# Patient Record
Sex: FEMALE | Race: WHITE | NOT HISPANIC OR LATINO | Employment: OTHER | URBAN - METROPOLITAN AREA
[De-identification: names, ages, dates, MRNs, and addresses within clinical notes are randomized per-mention and may not be internally consistent; named-entity substitution may affect disease eponyms.]

---

## 2023-03-01 LAB — SARS-COV-2 RESULT: NOT DETECTED

## 2023-07-11 LAB — C. DIFFICILE TOXIN, PCR: NOT DETECTED

## 2023-10-12 ENCOUNTER — LAB (OUTPATIENT)
Dept: LAB | Facility: LAB | Age: 78
End: 2023-10-12
Payer: COMMERCIAL

## 2023-10-12 DIAGNOSIS — K59.1 FUNCTIONAL DIARRHEA: Primary | ICD-10-CM

## 2023-11-07 ENCOUNTER — LAB (OUTPATIENT)
Dept: LAB | Facility: LAB | Age: 78
End: 2023-11-07
Payer: COMMERCIAL

## 2023-11-07 DIAGNOSIS — Z79.4 LONG TERM (CURRENT) USE OF INSULIN (MULTI): ICD-10-CM

## 2023-11-07 DIAGNOSIS — E11.22 TYPE 2 DIABETES MELLITUS WITH DIABETIC CHRONIC KIDNEY DISEASE (MULTI): ICD-10-CM

## 2023-11-07 DIAGNOSIS — N18.2 CHRONIC KIDNEY DISEASE, STAGE 2 (MILD): ICD-10-CM

## 2023-11-07 DIAGNOSIS — E03.9 HYPOTHYROIDISM, UNSPECIFIED: Primary | ICD-10-CM

## 2023-11-07 LAB
EST. AVERAGE GLUCOSE BLD GHB EST-MCNC: 126 MG/DL
HBA1C MFR BLD: 6 %
T4 FREE SERPL-MCNC: 1.24 NG/DL (ref 0.78–1.48)
TSH SERPL-ACNC: 5.26 MIU/L (ref 0.44–3.98)

## 2023-11-07 PROCEDURE — 83036 HEMOGLOBIN GLYCOSYLATED A1C: CPT

## 2023-11-07 PROCEDURE — 36415 COLL VENOUS BLD VENIPUNCTURE: CPT

## 2023-11-07 PROCEDURE — 84443 ASSAY THYROID STIM HORMONE: CPT

## 2023-11-07 PROCEDURE — 84439 ASSAY OF FREE THYROXINE: CPT

## 2023-11-30 ENCOUNTER — OFFICE VISIT (OUTPATIENT)
Dept: VASCULAR SURGERY | Facility: CLINIC | Age: 78
End: 2023-11-30
Payer: COMMERCIAL

## 2023-11-30 VITALS
HEIGHT: 63 IN | WEIGHT: 160 LBS | BODY MASS INDEX: 28.35 KG/M2 | HEART RATE: 58 BPM | SYSTOLIC BLOOD PRESSURE: 120 MMHG | DIASTOLIC BLOOD PRESSURE: 60 MMHG

## 2023-11-30 DIAGNOSIS — T82.858D ARTERIOVENOUS FISTULA STENOSIS, SUBSEQUENT ENCOUNTER: Primary | ICD-10-CM

## 2023-11-30 PROCEDURE — 1160F RVW MEDS BY RX/DR IN RCRD: CPT | Performed by: SURGERY

## 2023-11-30 PROCEDURE — 99214 OFFICE O/P EST MOD 30 MIN: CPT | Performed by: SURGERY

## 2023-11-30 PROCEDURE — 1125F AMNT PAIN NOTED PAIN PRSNT: CPT | Performed by: SURGERY

## 2023-11-30 PROCEDURE — 1159F MED LIST DOCD IN RCRD: CPT | Performed by: SURGERY

## 2023-11-30 RX ORDER — LISINOPRIL 2.5 MG/1
2.5 TABLET ORAL
COMMUNITY
End: 2024-02-06 | Stop reason: ALTCHOICE

## 2023-11-30 RX ORDER — HUMAN INSULIN 100 [IU]/ML
INJECTION, SUSPENSION SUBCUTANEOUS
COMMUNITY

## 2023-11-30 RX ORDER — LOPERAMIDE HYDROCHLORIDE 2 MG/1
CAPSULE ORAL
COMMUNITY
Start: 2020-06-24

## 2023-11-30 RX ORDER — METOPROLOL TARTRATE 25 MG/1
12.5 TABLET, FILM COATED ORAL DAILY
COMMUNITY

## 2023-11-30 RX ORDER — CALCIUM CARBONATE 400(1000)
TABLET,CHEWABLE ORAL
COMMUNITY
Start: 2019-06-28

## 2023-11-30 RX ORDER — LANOLIN ALCOHOL/MO/W.PET/CERES
1 CREAM (GRAM) TOPICAL DAILY
COMMUNITY
Start: 2017-11-02

## 2023-11-30 RX ORDER — ATORVASTATIN CALCIUM 40 MG/1
40 TABLET, FILM COATED ORAL NIGHTLY
COMMUNITY

## 2023-11-30 RX ORDER — LEVOTHYROXINE SODIUM 50 UG/1
TABLET ORAL
COMMUNITY
End: 2024-02-06 | Stop reason: ALTCHOICE

## 2023-11-30 RX ORDER — GABAPENTIN 100 MG/1
CAPSULE ORAL
COMMUNITY

## 2023-11-30 RX ORDER — ERGOCALCIFEROL 1.25 MG/1
1 CAPSULE ORAL
COMMUNITY

## 2023-11-30 RX ORDER — RENO CAPS 100; 1.5; 1.7; 20; 10; 1; 150; 5; 6 MG/1; MG/1; MG/1; MG/1; MG/1; MG/1; UG/1; MG/1; UG/1
CAPSULE ORAL
COMMUNITY
End: 2024-01-31 | Stop reason: ALTCHOICE

## 2023-11-30 RX ORDER — PEN NEEDLE, DIABETIC 29 G X1/2"
NEEDLE, DISPOSABLE MISCELLANEOUS
COMMUNITY
Start: 2023-08-07

## 2023-11-30 RX ORDER — TRAMADOL HYDROCHLORIDE 50 MG/1
50 TABLET ORAL EVERY 6 HOURS PRN
COMMUNITY
Start: 2023-10-17

## 2023-11-30 RX ORDER — ASPIRIN 81 MG/1
1 TABLET ORAL DAILY
COMMUNITY
Start: 2019-08-07

## 2023-12-01 PROBLEM — T82.858A ARTERIOVENOUS FISTULA STENOSIS (CMS-HCC): Status: ACTIVE | Noted: 2023-12-01

## 2023-12-12 PROBLEM — I95.3 HEMODIALYSIS-ASSOCIATED HYPOTENSION: Status: ACTIVE | Noted: 2023-12-12

## 2023-12-12 PROBLEM — S82.101A: Status: ACTIVE | Noted: 2023-12-12

## 2023-12-12 PROBLEM — G95.20 SPINAL CORD COMPRESSION (MULTI): Status: ACTIVE | Noted: 2023-12-12

## 2023-12-12 PROBLEM — D61.818 PANCYTOPENIA (MULTI): Status: ACTIVE | Noted: 2023-12-12

## 2023-12-12 PROBLEM — I35.0 AORTIC STENOSIS: Status: ACTIVE | Noted: 2023-12-12

## 2023-12-12 PROBLEM — K58.0 IRRITABLE BOWEL SYNDROME WITH DIARRHEA: Status: ACTIVE | Noted: 2023-06-14

## 2023-12-12 PROBLEM — M47.816 LUMBAR SPONDYLOSIS: Status: ACTIVE | Noted: 2023-12-12

## 2023-12-12 PROBLEM — I50.30 DIASTOLIC CONGESTIVE HEART FAILURE (MULTI): Status: ACTIVE | Noted: 2023-12-12

## 2023-12-12 PROBLEM — R60.9 EDEMA: Status: ACTIVE | Noted: 2023-12-12

## 2023-12-12 PROBLEM — R19.7 DIARRHEA OF PRESUMED INFECTIOUS ORIGIN: Status: ACTIVE | Noted: 2023-06-14

## 2023-12-12 PROBLEM — G89.29 CHRONIC NECK PAIN: Status: ACTIVE | Noted: 2023-12-12

## 2023-12-12 PROBLEM — G95.9 CERVICAL MYELOPATHY (MULTI): Status: ACTIVE | Noted: 2023-12-12

## 2023-12-12 PROBLEM — E03.9 ACQUIRED HYPOTHYROIDISM: Status: ACTIVE | Noted: 2023-06-14

## 2023-12-12 PROBLEM — M19.90 ARTHRITIS: Status: ACTIVE | Noted: 2023-12-12

## 2023-12-12 PROBLEM — E83.39 HYPERPHOSPHATEMIA: Status: ACTIVE | Noted: 2023-12-12

## 2023-12-12 PROBLEM — M79.603 ARM PAIN: Status: ACTIVE | Noted: 2023-12-12

## 2023-12-12 PROBLEM — G62.9 NEUROPATHY: Status: ACTIVE | Noted: 2023-12-12

## 2023-12-12 PROBLEM — G56.11 RIGHT MEDIAN NERVE NEUROPATHY: Status: ACTIVE | Noted: 2023-12-12

## 2023-12-12 PROBLEM — I82.B19: Status: ACTIVE | Noted: 2023-12-12

## 2023-12-12 PROBLEM — G99.2 STENOSIS OF CERVICAL SPINE WITH MYELOPATHY (MULTI): Status: ACTIVE | Noted: 2023-12-12

## 2023-12-12 PROBLEM — N18.6 END STAGE RENAL FAILURE ON DIALYSIS (MULTI): Status: ACTIVE | Noted: 2023-07-19

## 2023-12-12 PROBLEM — E11.22 TYPE 2 DIABETES MELLITUS WITH DIABETIC CHRONIC KIDNEY DISEASE (MULTI): Status: ACTIVE | Noted: 2023-06-14

## 2023-12-12 PROBLEM — M54.2 CHRONIC NECK PAIN: Status: ACTIVE | Noted: 2023-12-12

## 2023-12-12 PROBLEM — M54.16 LUMBAR NEURITIS: Status: ACTIVE | Noted: 2023-12-12

## 2023-12-12 PROBLEM — Z99.2: Status: ACTIVE | Noted: 2023-12-12

## 2023-12-12 PROBLEM — M48.061 LUMBAR FORAMINAL STENOSIS: Status: ACTIVE | Noted: 2023-12-12

## 2023-12-12 PROBLEM — M48.02 STENOSIS OF CERVICAL SPINE WITH MYELOPATHY (MULTI): Status: ACTIVE | Noted: 2023-12-12

## 2023-12-12 PROBLEM — I50.30 (HFPEF) HEART FAILURE WITH PRESERVED EJECTION FRACTION (MULTI): Status: ACTIVE | Noted: 2023-12-12

## 2023-12-12 PROBLEM — M54.12 CERVICAL RADICULITIS: Status: ACTIVE | Noted: 2023-12-12

## 2023-12-12 PROBLEM — E78.1 ENDOGENOUS HYPERLIPIDEMIA: Status: ACTIVE | Noted: 2023-12-12

## 2023-12-12 PROBLEM — R79.89 LOW VITAMIN D LEVEL: Status: ACTIVE | Noted: 2023-12-12

## 2023-12-12 PROBLEM — S83.411A SPRAIN OF MEDIAL COLLATERAL LIGAMENT OF RIGHT KNEE: Status: ACTIVE | Noted: 2023-12-12

## 2023-12-12 PROBLEM — E78.00 HYPERCHOLESTEROLEMIA: Status: ACTIVE | Noted: 2023-12-12

## 2023-12-12 PROBLEM — I10 PRIMARY HYPERTENSION: Status: ACTIVE | Noted: 2023-06-14

## 2023-12-12 PROBLEM — K59.1 FUNCTIONAL DIARRHEA: Status: ACTIVE | Noted: 2023-07-19

## 2023-12-12 PROBLEM — I87.309 VENOUS HYPERTENSION: Status: ACTIVE | Noted: 2023-12-12

## 2023-12-12 PROBLEM — M25.561 RIGHT KNEE PAIN: Status: ACTIVE | Noted: 2023-12-12

## 2023-12-12 PROBLEM — Z99.2 END STAGE RENAL FAILURE ON DIALYSIS (MULTI): Status: ACTIVE | Noted: 2023-07-19

## 2023-12-12 PROBLEM — R79.89 ELEVATED TROPONIN: Status: ACTIVE | Noted: 2023-12-12

## 2023-12-12 PROBLEM — I10 HYPERTENSION, BENIGN: Status: ACTIVE | Noted: 2023-12-12

## 2023-12-12 PROBLEM — Z86.2 HISTORY OF THROMBOCYTOPENIA: Status: ACTIVE | Noted: 2023-12-12

## 2023-12-12 PROBLEM — E11.9 DIABETES MELLITUS (MULTI): Status: ACTIVE | Noted: 2023-12-12

## 2023-12-12 PROBLEM — I22.2 SUBSEQUENT NON-ST ELEVATION (NSTEMI) MYOCARDIAL INFARCTION (MULTI): Status: ACTIVE | Noted: 2023-12-12

## 2023-12-12 RX ORDER — RENO CAPS 100; 1.5; 1.7; 20; 10; 1; 150; 5; 6 MG/1; MG/1; MG/1; MG/1; MG/1; MG/1; UG/1; MG/1; UG/1
CAPSULE ORAL EVERY 24 HOURS
COMMUNITY

## 2023-12-12 RX ORDER — PREGABALIN 75 MG/1
CAPSULE ORAL
COMMUNITY
Start: 2010-11-05 | End: 2024-01-31 | Stop reason: ALTCHOICE

## 2023-12-12 RX ORDER — PANTOPRAZOLE SODIUM 40 MG/1
TABLET, DELAYED RELEASE ORAL
COMMUNITY
Start: 2023-06-06 | End: 2024-01-31 | Stop reason: ALTCHOICE

## 2023-12-12 RX ORDER — TRIAMTERENE AND HYDROCHLOROTHIAZIDE 37.5; 25 MG/1; MG/1
CAPSULE ORAL
COMMUNITY
Start: 2009-06-29 | End: 2024-01-31 | Stop reason: ALTCHOICE

## 2023-12-12 RX ORDER — MELOXICAM 15 MG/1
TABLET ORAL
COMMUNITY
Start: 2009-07-13 | End: 2024-01-31 | Stop reason: ALTCHOICE

## 2023-12-12 RX ORDER — INSULIN LISPRO 100 [IU]/ML
INJECTION, SOLUTION INTRAVENOUS; SUBCUTANEOUS
COMMUNITY
Start: 2023-04-17 | End: 2024-01-31 | Stop reason: ALTCHOICE

## 2023-12-12 RX ORDER — CHOLECALCIFEROL (VITAMIN D3) 50 MCG
TABLET ORAL
COMMUNITY
Start: 2010-11-05

## 2023-12-12 RX ORDER — LISINOPRIL 40 MG/1
1 TABLET ORAL DAILY
COMMUNITY
Start: 2017-11-02

## 2023-12-12 RX ORDER — CYCLOBENZAPRINE HCL 10 MG
TABLET ORAL
COMMUNITY
Start: 2010-11-05 | End: 2024-01-31 | Stop reason: ALTCHOICE

## 2023-12-12 RX ORDER — HYDROCODONE BITARTRATE AND ACETAMINOPHEN 5; 325 MG/1; MG/1
1 TABLET ORAL
COMMUNITY
End: 2024-01-31 | Stop reason: ALTCHOICE

## 2023-12-12 RX ORDER — INSULIN ASPART 100 [IU]/ML
INJECTION, SOLUTION INTRAVENOUS; SUBCUTANEOUS
COMMUNITY
End: 2024-02-06 | Stop reason: ALTCHOICE

## 2023-12-12 RX ORDER — OXYCODONE AND ACETAMINOPHEN 5; 325 MG/1; MG/1
TABLET ORAL
COMMUNITY
Start: 2009-06-29 | End: 2024-01-31 | Stop reason: ALTCHOICE

## 2023-12-12 RX ORDER — LISINOPRIL 20 MG/1
20 TABLET ORAL DAILY
COMMUNITY
End: 2024-02-06 | Stop reason: ALTCHOICE

## 2023-12-12 RX ORDER — GLYBURIDE 5 MG/1
TABLET ORAL
COMMUNITY
Start: 2009-06-29 | End: 2024-01-31 | Stop reason: ALTCHOICE

## 2023-12-12 RX ORDER — LISINOPRIL 10 MG/1
TABLET ORAL
COMMUNITY
Start: 2009-06-29 | End: 2024-01-31 | Stop reason: ALTCHOICE

## 2023-12-12 RX ORDER — LEVOTHYROXINE SODIUM 100 UG/1
TABLET ORAL
COMMUNITY
Start: 2023-04-23

## 2023-12-13 ENCOUNTER — APPOINTMENT (OUTPATIENT)
Dept: PAIN MEDICINE | Facility: CLINIC | Age: 78
End: 2023-12-13
Payer: COMMERCIAL

## 2023-12-18 ENCOUNTER — TELEPHONE (OUTPATIENT)
Dept: GASTROENTEROLOGY | Facility: CLINIC | Age: 78
End: 2023-12-18
Payer: COMMERCIAL

## 2023-12-28 ENCOUNTER — TELEPHONE (OUTPATIENT)
Dept: GASTROENTEROLOGY | Facility: CLINIC | Age: 78
End: 2023-12-28
Payer: COMMERCIAL

## 2023-12-28 NOTE — TELEPHONE ENCOUNTER
Left voicemail for patient to schedule consult appt with Dr. Suarez per referral received via fax.

## 2024-01-31 ENCOUNTER — OFFICE VISIT (OUTPATIENT)
Dept: PAIN MEDICINE | Facility: CLINIC | Age: 79
End: 2024-01-31
Payer: COMMERCIAL

## 2024-01-31 VITALS
TEMPERATURE: 97.3 F | WEIGHT: 160 LBS | DIASTOLIC BLOOD PRESSURE: 67 MMHG | BODY MASS INDEX: 27.31 KG/M2 | SYSTOLIC BLOOD PRESSURE: 145 MMHG | RESPIRATION RATE: 15 BRPM | HEIGHT: 64 IN | HEART RATE: 60 BPM

## 2024-01-31 DIAGNOSIS — M54.12 CERVICAL RADICULITIS: Primary | ICD-10-CM

## 2024-01-31 DIAGNOSIS — M54.2 CHRONIC NECK PAIN: ICD-10-CM

## 2024-01-31 DIAGNOSIS — M54.16 LUMBAR NEURITIS: ICD-10-CM

## 2024-01-31 DIAGNOSIS — M48.061 LUMBAR FORAMINAL STENOSIS: ICD-10-CM

## 2024-01-31 DIAGNOSIS — G89.29 CHRONIC NECK PAIN: ICD-10-CM

## 2024-01-31 PROCEDURE — 99214 OFFICE O/P EST MOD 30 MIN: CPT | Performed by: ANESTHESIOLOGY

## 2024-01-31 SDOH — ECONOMIC STABILITY: FOOD INSECURITY: WITHIN THE PAST 12 MONTHS, YOU WORRIED THAT YOUR FOOD WOULD RUN OUT BEFORE YOU GOT MONEY TO BUY MORE.: NEVER TRUE

## 2024-01-31 SDOH — ECONOMIC STABILITY: FOOD INSECURITY: WITHIN THE PAST 12 MONTHS, THE FOOD YOU BOUGHT JUST DIDN'T LAST AND YOU DIDN'T HAVE MONEY TO GET MORE.: NEVER TRUE

## 2024-01-31 ASSESSMENT — LIFESTYLE VARIABLES
HOW OFTEN DO YOU HAVE A DRINK CONTAINING ALCOHOL: NEVER
HOW OFTEN DO YOU HAVE SIX OR MORE DRINKS ON ONE OCCASION: NEVER
SKIP TO QUESTIONS 9-10: 1
AUDIT-C TOTAL SCORE: 0
HOW MANY STANDARD DRINKS CONTAINING ALCOHOL DO YOU HAVE ON A TYPICAL DAY: PATIENT DOES NOT DRINK

## 2024-01-31 ASSESSMENT — PATIENT HEALTH QUESTIONNAIRE - PHQ9
SUM OF ALL RESPONSES TO PHQ9 QUESTIONS 1 AND 2: 0
2. FEELING DOWN, DEPRESSED OR HOPELESS: NOT AT ALL
1. LITTLE INTEREST OR PLEASURE IN DOING THINGS: NOT AT ALL

## 2024-01-31 ASSESSMENT — COLUMBIA-SUICIDE SEVERITY RATING SCALE - C-SSRS
6. HAVE YOU EVER DONE ANYTHING, STARTED TO DO ANYTHING, OR PREPARED TO DO ANYTHING TO END YOUR LIFE?: NO
2. HAVE YOU ACTUALLY HAD ANY THOUGHTS OF KILLING YOURSELF?: NO
1. IN THE PAST MONTH, HAVE YOU WISHED YOU WERE DEAD OR WISHED YOU COULD GO TO SLEEP AND NOT WAKE UP?: NO

## 2024-01-31 ASSESSMENT — ENCOUNTER SYMPTOMS: OCCASIONAL FEELINGS OF UNSTEADINESS: 1

## 2024-01-31 ASSESSMENT — PAIN SCALES - GENERAL: PAINLEVEL: 7

## 2024-01-31 NOTE — PROGRESS NOTES
History Of Present Illness  Lyly Draper is a 79 y.o. female presenting with   Chief Complaint   Patient presents with    Follow-up     Patient who is RHD presents with complaints of chronic right arm pain with N/T in her bilateral hands. Pt reports pain in her bilateral LE including her hips. On HD MWF at [facility] on [address].      The pt is s/p L4 thru S1 posterior spinal fusion with rods and screws in 2010 with Dr. Dove at [facility].      The pt underwent C3-7 cervical fusion surgery with Dr. Gamino on 3-3-2023.      The pt also has a history of low back pain to the B/L anterolateral LE. The pain is constant, worse with activity and better with rest. The pain is sharp, stabbing and shooting to the B/L LE. Denies LE paresthesias, weakness, saddle anesthesia, bowel or bladder incontinence. The patient has ESRD and HD thru a left arm fistula MWF at [facility], IDDM, DLD, Hypothyroidism.      PSHx  -Left arm Fistula with Dr. Alford   -Lumbar surgery with Dr. Dove in Nov of 2010 with rods and screw placement.   -Tonsillectomy     PAIN SCORE: 7/10.     PCP: Dr. James Medina          Past Medical History  She has a past medical history of Personal history of diseases of the blood and blood-forming organs and certain disorders involving the immune mechanism, Personal history of other diseases of the circulatory system (08/16/2022), Personal history of other diseases of urinary system (08/16/2022), Personal history of other drug therapy, Personal history of other endocrine, nutritional and metabolic disease (08/16/2022), and Personal history of other endocrine, nutritional and metabolic disease (08/16/2022).    Surgical History  She has a past surgical history that includes Back surgery (11/02/2017); Other surgical history (08/16/2022); and Other surgical history (11/27/2017).     Social History  She reports that she has never smoked. She has never used smokeless tobacco. She reports that she does not drink alcohol  and does not use drugs.    Family History  No family history on file.     Allergies  Penicillins    Review of Systems    All other systems reviewed and negative for any deficits. Pertinent positives and negatives were considered in the medical decision making process.        Physical Exam  /67   Pulse 60   Temp 36.3 °C (97.3 °F)   Resp 15   Wt 72.6 kg (160 lb)     General: Pt appears stated age    Eyes: Conjunctiva non-icteric and lids without obvious rash or drooping. Pupils are symmetric    ENT: External ears and nose appear to be without deformity or rash. No lesions or masses noted. Hearing is grossly intact    Neck: No JVD noted, tracheal position midline. No goiter noted on assessment of thyroid    Respiratory: No gasping or shortness of breath noted, no use of accessory muscles noted    Cardiovascular: Extremities show no edema or varicosities    Skin: No rashes or open lesions/ulcers identified on skin. No induration/tightening noted with palpation of skin    Musculoskeletal: Gait is antalgic, uses a wheeled walker     Digits/nails show no clubbing or cyanosis    Exam of muscles/joints/bones shows no gross atrophy and no abnormal/involuntary movements in the head/neckNo asymmetry or masses noted in the head/neck    Stability: no subluxation noted on movement of bilateral upper extremities or head/neck    Strength: 5/5 in RLE and 5/5 LLE     Range of Motion: WNL     Neurologic: Reflexes: 2+     Sensation: WNL    Cranial nerves 2-12 are grossly intact    Psychiatric: Pt is alert and oriented to time, place and person.         Assessment/Plan   1. Cervical radiculitis  Epidural Steroid Injection    FL pain management TC      2. Chronic neck pain        3. Lumbar neuritis        4. Lumbar foraminal stenosis              Diagnoses/Problems   · Cervical radiculitis (723.4) (M54.12)   · Chronic neck pain (723.1,338.29) (M54.2,G89.29)   · Cervical postlaminectomy syndrome (722.81) (M96.1)   · Cervical  myelopathy (721.1) (G95.9)     Provider Impressions     1. I have previously provided the patient with a list of physical therapy exercises to learn and perform to strengthen core.      2. I would recommend the pt CONTINUE on Gabapentin to help with nerve related pain. We discussed the risks, benefits, and side effects to this medication including the mechanism of action and the pt understands and agrees.     3. I extensively reviewed the patients LUMBAR X-RAYS and hip x-ray findings in detail, including review of the actual images and provided a detailed explanation of the findings using a spine model.     There is multilevel spondylolisthesis of the lumbar spine and previous spinal fusion of L4 thru S1.   There is mild OA of the bilateral hips without any acute process.      4. We did discuss the risks, benefits, and alternatives to chronic opioid therapy and that usage can be a danger to life. We also discussed proper and safe storage of medication to prevent unauthorized usage. The patient understands and will use the medicine responsibly.     Medication is managed by PCP.      Pt may switch to our office. I did discuss the need for frequent follow ups, UDS, and contract.      5. The patient is a candidate for an IRVIN at T1/2 to treat back and radicular pain. I spent time with the patient discussing all of the risks, benefits, and alternatives to this measure. Including but not limited to spinal infection, epidural hematoma/abscess, paralysis, nerve injury, steroid effects, and spinal headache.     We will contact the patients PCP to determine if it is safe to stop ASA for 7 days prior to procedure. If Pt is to be bridged on Lovenox they will need to be off of Lovenox for 24 hours prior to the procedure. We did discuss the risks for stopping anticoagulation including, but not limited to any cardiovascular event, embolism, and even death. The patient understands these risks and would like to proceed with the  procedure.    6. I again extensively reviewed the patients Cervical x-ray and MRI findings in detail, including review of the actual images and provided a detailed explanation of the findings using a spine model.      There multilevel cervical spondylolisthesis with disc herniations at C5/6, C6/7 and C7/T1. There is noted severe stenosis at the C3/4 level due to large disc osteophyte.      The pt underwent C3-7 cervical fusion surgery with Dr. Gamino on 3-3-2023 with rods and screws.      The pt stated her postoperative course was difficult for her. She had trouble talking/swallowing and was in the hospital for 1 week and then in Rehab for 2.5 months time.      FUV PRN     I spent time with the patient reviewing their imaging and discussing the risks benefits and alternatives to the above plan. A total of 30 minutes was spent reviewing the data and greater than 50% of that time was with the patient during the face to face encounter discussing treatment options both surgical, non-surgical, and minimally invasive techniques.     Aram Tran MD

## 2024-02-06 ENCOUNTER — OFFICE VISIT (OUTPATIENT)
Dept: GASTROENTEROLOGY | Facility: CLINIC | Age: 79
End: 2024-02-06
Payer: COMMERCIAL

## 2024-02-06 VITALS
SYSTOLIC BLOOD PRESSURE: 135 MMHG | HEART RATE: 56 BPM | BODY MASS INDEX: 28.35 KG/M2 | DIASTOLIC BLOOD PRESSURE: 61 MMHG | WEIGHT: 160 LBS | HEIGHT: 63 IN

## 2024-02-06 DIAGNOSIS — K58.0 IRRITABLE BOWEL SYNDROME WITH DIARRHEA: Primary | ICD-10-CM

## 2024-02-06 PROCEDURE — 1125F AMNT PAIN NOTED PAIN PRSNT: CPT | Performed by: INTERNAL MEDICINE

## 2024-02-06 PROCEDURE — 1160F RVW MEDS BY RX/DR IN RCRD: CPT | Performed by: INTERNAL MEDICINE

## 2024-02-06 PROCEDURE — 1159F MED LIST DOCD IN RCRD: CPT | Performed by: INTERNAL MEDICINE

## 2024-02-06 PROCEDURE — 3075F SYST BP GE 130 - 139MM HG: CPT | Performed by: INTERNAL MEDICINE

## 2024-02-06 PROCEDURE — 3078F DIAST BP <80 MM HG: CPT | Performed by: INTERNAL MEDICINE

## 2024-02-06 PROCEDURE — 99204 OFFICE O/P NEW MOD 45 MIN: CPT | Performed by: INTERNAL MEDICINE

## 2024-02-06 PROCEDURE — 1036F TOBACCO NON-USER: CPT | Performed by: INTERNAL MEDICINE

## 2024-02-06 ASSESSMENT — ENCOUNTER SYMPTOMS
NEUROLOGICAL NEGATIVE: 1
EYES NEGATIVE: 1
CONSTITUTIONAL NEGATIVE: 1
ROS GI COMMENTS: AS IN HPI
PSYCHIATRIC NEGATIVE: 1
RESPIRATORY NEGATIVE: 1
ENDOCRINE NEGATIVE: 1
MUSCULOSKELETAL NEGATIVE: 1
CARDIOVASCULAR NEGATIVE: 1

## 2024-02-06 NOTE — PROGRESS NOTES
Subjective     History of Present Illness:   Lyly Draper is a 79 y.o. female who presents to GI clinic for chronic diarrhea.  What she calls diarrhea is actually loose stools which she has not every day only sometimes when she has bowel movements.  She only has 1 bowel movement a day sometimes 2.  She said she has been suffering with this for 15 years and then it got better on its own and then most recently we got worse afterwards.  She does have a lot of medical problems is on end-stage renal disease on dialysis and recently had surgery on her cervical spine.  .  He recently started taking a probiotic which helped with her symptoms.  She said it did not completely resolve her symptoms but help with her symptoms.        Review of Systems  Review of Systems   Constitutional: Negative.    HENT: Negative.     Eyes: Negative.    Respiratory: Negative.     Cardiovascular: Negative.    Gastrointestinal:         As in HPI    Endocrine: Negative.    Genitourinary: Negative.    Musculoskeletal: Negative.    Skin: Negative.    Neurological: Negative.    Psychiatric/Behavioral: Negative.         Past Medical History   has a past medical history of Personal history of diseases of the blood and blood-forming organs and certain disorders involving the immune mechanism, Personal history of other diseases of the circulatory system (08/16/2022), Personal history of other diseases of urinary system (08/16/2022), Personal history of other drug therapy, Personal history of other endocrine, nutritional and metabolic disease (08/16/2022), and Personal history of other endocrine, nutritional and metabolic disease (08/16/2022).     Social History   reports that she has never smoked. She has never used smokeless tobacco. She reports that she does not drink alcohol and does not use drugs.     Family History  family history is not on file.     Allergies  Allergies   Allergen Reactions    Penicillins Anaphylaxis and Rash  "      Medications  Current Outpatient Medications   Medication Instructions    aspirin 81 mg EC tablet 1 tablet, oral, Daily    atorvastatin (LIPITOR) 40 mg, oral, Nightly    B complex-vitamin C-folic acid (Lewis Caps) 1 mg capsule Every 24 hours    BD Insulin Syringe Ultra-Fine 0.5 mL 31 gauge x 5/16\" syringe USE AS DIRECTED TWICE A DAY    calcium carbonate (Tums Ultra) 400 mg calcium (1,000 mg) chewable tablet oral    cholecalciferol (Vitamin D3) 50 MCG (2000 UT) tablet BID    cyanocobalamin (Vitamin B-12) 1,000 mcg tablet 1 tablet, oral, Daily    ergocalciferol (Vitamin D-2) 1.25 MG (82681 UT) capsule 1 capsule, oral, Weekly    gabapentin (Neurontin) 100 mg capsule TAKE 2 CAPSULE IN THE MORNING (AFTER DIALYSIS) AND TAKE 2 CAPSULES AT BEDTIME DAILY.    insulin NPH and regular human (HumuLIN 70-30, NovoLIN 70-30) 100 unit/mL (70-30) injection subcutaneous, 2 times daily before meals, Take as directed per insulin instructions.    levothyroxine (Synthroid, Levoxyl) 100 mcg tablet     lisinopril 40 mg tablet 1 tablet, oral, Daily    loperamide (Imodium) 2 mg capsule oral    metoprolol tartrate (LOPRESSOR) 12.5 mg, oral, Daily    NovoLIN N NPH U-100 Insulin 100 unit/mL injection 15 UNITS SUBCUTANEOUSLY ONCE DAILY 90 DAYS    traMADol (ULTRAM) 50 mg, oral, Every 6 hours PRN        Objective   Visit Vitals  /61   Pulse 56      Physical Exam  Vitals reviewed.   Constitutional:       Appearance: Normal appearance.   HENT:      Head: Normocephalic.   Eyes:      Conjunctiva/sclera: Conjunctivae normal.      Pupils: Pupils are equal, round, and reactive to light.   Cardiovascular:      Rate and Rhythm: Normal rate and regular rhythm.   Pulmonary:      Effort: Pulmonary effort is normal.      Breath sounds: Normal breath sounds.   Abdominal:      General: Abdomen is flat. Bowel sounds are normal. There is no distension.      Palpations: Abdomen is soft.      Tenderness: There is no abdominal tenderness. There is no " guarding or rebound.   Musculoskeletal:         General: Normal range of motion.   Skin:     General: Skin is warm and dry.   Neurological:      General: No focal deficit present.      Mental Status: She is alert and oriented to person, place, and time.           [unfilled]       Assessment/Plan   Lyly Draper is a 79 y.o. female who presents to GI clinic for evaluation of chronic diarrhea.  She really has only 1 time bowel movements a day.  Symptoms are getting better with probiotic.  She had a colonoscopy more than 10 years ago and she does not think she can get another one.    I will do some basic stool test however I do not think that she needs anything significant at this time.  I did told her to use fiber or Metamucil on a daily basis.  Continue probiotic.  Follow-up as needed.  .          Bull Suarez MD

## 2024-02-17 PROCEDURE — 83993 ASSAY FOR CALPROTECTIN FECAL: CPT | Performed by: INTERNAL MEDICINE

## 2024-02-17 PROCEDURE — 82653 EL-1 FECAL QUANTITATIVE: CPT | Performed by: INTERNAL MEDICINE

## 2024-02-18 ENCOUNTER — HOSPITAL ENCOUNTER (OUTPATIENT)
Dept: RADIOLOGY | Facility: CLINIC | Age: 79
Discharge: HOME | End: 2024-02-18
Payer: COMMERCIAL

## 2024-02-18 DIAGNOSIS — M25.562 ACUTE PAIN OF LEFT KNEE: ICD-10-CM

## 2024-02-18 PROCEDURE — 73564 X-RAY EXAM KNEE 4 OR MORE: CPT | Mod: LT

## 2024-02-18 PROCEDURE — 73564 X-RAY EXAM KNEE 4 OR MORE: CPT | Mod: LEFT SIDE | Performed by: RADIOLOGY

## 2024-02-19 ENCOUNTER — LAB (OUTPATIENT)
Dept: LAB | Facility: LAB | Age: 79
End: 2024-02-19
Payer: COMMERCIAL

## 2024-02-22 ENCOUNTER — OFFICE VISIT (OUTPATIENT)
Dept: VASCULAR SURGERY | Facility: CLINIC | Age: 79
End: 2024-02-22
Payer: COMMERCIAL

## 2024-02-22 VITALS
HEIGHT: 63 IN | DIASTOLIC BLOOD PRESSURE: 56 MMHG | HEART RATE: 96 BPM | BODY MASS INDEX: 28.35 KG/M2 | OXYGEN SATURATION: 98 % | WEIGHT: 160 LBS | SYSTOLIC BLOOD PRESSURE: 108 MMHG

## 2024-02-22 DIAGNOSIS — T82.858D ARTERIOVENOUS FISTULA STENOSIS, SUBSEQUENT ENCOUNTER: Primary | ICD-10-CM

## 2024-02-22 LAB
CALPROTECTIN STL-MCNT: 21 UG/G
ELASTASE PANC STL-MCNT: 159 UG/G

## 2024-02-22 PROCEDURE — 1036F TOBACCO NON-USER: CPT | Performed by: SURGERY

## 2024-02-22 PROCEDURE — 99214 OFFICE O/P EST MOD 30 MIN: CPT | Performed by: SURGERY

## 2024-02-22 PROCEDURE — 1160F RVW MEDS BY RX/DR IN RCRD: CPT | Performed by: SURGERY

## 2024-02-22 PROCEDURE — 1159F MED LIST DOCD IN RCRD: CPT | Performed by: SURGERY

## 2024-02-22 PROCEDURE — 3078F DIAST BP <80 MM HG: CPT | Performed by: SURGERY

## 2024-02-22 PROCEDURE — 3074F SYST BP LT 130 MM HG: CPT | Performed by: SURGERY

## 2024-02-22 PROCEDURE — 1125F AMNT PAIN NOTED PAIN PRSNT: CPT | Performed by: SURGERY

## 2024-02-24 NOTE — PROGRESS NOTES
"Lyly Draper is a 79 y.o. female     Subjective   This patient presents today for follow-up of her upper arm AV fistula.  She states that dialysis is going relatively well.  Her left arm does swell but seems to be tolerable to her per the patient.  She has never smoked.  She is a diabetic.  She is currently 79 years old.  She denies any fever chills nausea vomiting or headache         Objective     Vitals:    02/22/24 0900   BP: 108/56   Pulse: 96   SpO2: 98%      Physical Exam  This patient is alert and oriented x 3.  She is in no acute distress.  Head is normocephalic.  Pupils are equal and reactive to light and accommodation.  Neck is soft and supple without palpable lymph nodes.  Heart is regular rate.  Lungs are clear.  Abdomen is soft with positive bowel sounds there is no pain on palpation.  Right upper extremity seems to have adequate range of motion.  Her left upper extremity has some decreased range of motion.  Her left arm volume is much larger than her right.  Her lower extremities seem to have some decreased range of motion with palpable brachial radial and femoral pulses.  Her left upper arm AV fistula has a slight thrill but mostly pulse.  Psychologically she seems to be acting appropriately  Blood pressure 108/56, pulse 96, height 1.6 m (5' 3\"), weight 72.6 kg (160 lb), SpO2 98 %.            Patient Active Problem List    Diagnosis Date Noted    Aortic stenosis 12/12/2023    Arm pain 12/12/2023    Arthritis 12/12/2023    Cervical myelopathy (CMS/[facility]) 12/12/2023    Stenosis of cervical spine with myelopathy (CMS/[facility]) 12/12/2023    Cervical radiculitis 12/12/2023    Chronic neck pain 12/12/2023    Diabetes mellitus (CMS/[facility]) 12/12/2023    (HFpEF) heart failure with preserved ejection fraction (CMS/[facility]) 12/12/2023    Diastolic congestive heart failure (CMS/[facility]) 12/12/2023    Edema 12/12/2023    Elevated troponin 12/12/2023    Hemodialysis access, fistula mature (CMS/[facility]) 12/12/2023    " "Hemodialysis-associated hypotension 12/12/2023    History of thrombocytopenia 12/12/2023    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) 12/12/2023    Right knee pain 12/12/2023    Right median nerve neuropathy 12/12/2023    Spinal cord compression (CMS/[facility]) 12/12/2023    Sprain of medial collateral ligament of right knee 12/12/2023    Subclavian vein obstruction (CMS/[facility]) 12/12/2023    Subsequent non-ST elevation (NSTEMI) myocardial infarction (CMS/[facility]) 12/12/2023    Venous hypertension 12/12/2023    Stenosis of AV fistula (CMS/[facility]) 12/01/2023    End stage renal failure on dialysis (CMS/[facility]) 07/19/2023    Functional diarrhea 07/19/2023    Acquired hypothyroidism 06/14/2023    Type 2 diabetes mellitus with diabetic chronic kidney disease (CMS/[facility]) 06/14/2023    Primary hypertension 06/14/2023    Diarrhea of presumed infectious origin 06/14/2023    Irritable bowel syndrome with diarrhea 06/14/2023          Current Outpatient Medications:     aspirin 81 mg EC tablet, Take 1 tablet (81 mg) by mouth once daily., Disp: , Rfl:     atorvastatin (Lipitor) 40 mg tablet, Take 1 tablet (40 mg) by mouth once daily at bedtime., Disp: , Rfl:     B complex-vitamin C-folic acid (Emery Caps) 1 mg capsule, once every 24 hours., Disp: , Rfl:     BD Insulin Syringe Ultra-Fine 0.5 mL 31 gauge x 5/16\" syringe, USE AS DIRECTED TWICE A DAY, Disp: , Rfl:     calcium carbonate (Tums Ultra) 400 mg calcium (1,000 mg) chewable tablet, Chew., Disp: , Rfl:     cholecalciferol (Vitamin D3) 50 MCG (2000 UT) tablet, BID, Disp: , Rfl:     cyanocobalamin (Vitamin B-12) 1,000 mcg tablet, Take 1 tablet (1,000 " mcg) by mouth once daily., Disp: , Rfl:     ergocalciferol (Vitamin D-2) 1.25 MG (69332 UT) capsule, Take 1 capsule (1,250 mcg) by mouth 1 (one) time per week., Disp: , Rfl:     gabapentin (Neurontin) 100 mg capsule, TAKE 2 CAPSULE IN THE MORNING (AFTER DIALYSIS) AND TAKE 2 CAPSULES AT BEDTIME DAILY., Disp: , Rfl:     insulin NPH and regular human (HumuLIN 70-30, NovoLIN 70-30) 100 unit/mL (70-30) injection, Inject under the skin 2 times a day before meals. Take as directed per insulin instructions., Disp: , Rfl:     levothyroxine (Synthroid, Levoxyl) 100 mcg tablet, , Disp: , Rfl:     lisinopril 40 mg tablet, Take 1 tablet (40 mg) by mouth once daily., Disp: , Rfl:     loperamide (Imodium) 2 mg capsule, Take by mouth., Disp: , Rfl:     metoprolol tartrate (Lopressor) 25 mg tablet, Take 0.5 tablets (12.5 mg) by mouth once daily., Disp: , Rfl:     NovoLIN N NPH U-100 Insulin 100 unit/mL injection, 15 UNITS SUBCUTANEOUSLY ONCE DAILY 90 DAYS, Disp: , Rfl:     traMADol (Ultram) 50 mg tablet, Take 1 tablet (50 mg) by mouth every 6 hours if needed., Disp: , Rfl:      Lab Results   Component Value Date    WBC 6.0 03/13/2023    HGB 9.0 (L) 03/13/2023    HCT 28.0 (L) 03/13/2023     (L) 03/13/2023    CHOL 110 03/06/2023    TRIG 129 03/06/2023    HDL 30.7 (A) 03/06/2023    ALT 11 04/12/2022    AST 13 04/12/2022     (L) 03/13/2023    K 4.2 03/13/2023    CL 92 (L) 03/13/2023    CREATININE 6.22 (H) 03/13/2023    BUN 60 (H) 03/13/2023    CO2 29 03/13/2023    TSH 5.26 (H) 11/07/2023    INR 2.0 (H) 03/07/2023    HGBA1C 6.0 (H) 11/07/2023    HGBA1C 6.0 (H) 11/07/2023       XR knee left 4+ views    Result Date: 2/18/2024  Interpreted By:  Jazmyne Tavarez, STUDY: XR KNEE LEFT 4+ VIEWS;  2/18/2024 9:34 am   INDICATION: Signs/Symptoms:pain s/p injury 2 weeks .   COMPARISON: None.   ACCESSION NUMBER(S): [id number]   ORDERING CLINICIAN: EASTON DOAN   FINDINGS: Four views of the left knee obtained.   Osteopenia. No definite  acute fracture or osseous displacement. Mild medial compartment narrowing. Patellar spurring. Small suprapatellar effusion and probable anterior/suprapatellar soft tissue swelling. Extensive presumed vascular calcifications in the posterior soft tissues.       No evidence of fracture. Joint effusion and probable anterior/suprapatellar soft tissue swelling. Mild productive/degenerative changes.   MACRO: None.   Signed by: Jazmyne Tavarez 2/18/2024 10:10 AM Dictation workstation:   [id number]    XR knee left 4+ views    Result Date: 2/18/2024  xr left knee 4 views Interpreted By:  Jazmyne Tavarez, STUDY: XR KNEE LEFT 4+ VIEWS;  2/18/2024 9:34 am    INDICATION: Signs/Symptoms:pain s/p injury 2 weeks .    COMPARISON: None.    ACCESSION NUMBER(S): [id number]    ORDERING CLINICIAN: EASTON DOAN    FINDINGS: Four views of the left knee obtained.    Osteopenia. No definite acute fracture or osseous displacement. Mild medial compartment narrowing. Patellar spurring. Small suprapatellar effusion and probable anterior/suprapatellar soft tissue swelling. Extensive presumed vascular calcifications in the posterior soft tissues.    IMPRESSION: No evidence of fracture. Joint effusion and probable anterior/suprapatellar soft tissue swelling. Mild productive/degenerative changes.    MACRO: None.    Signed by: Jazmyne Tavarez 2/18/2024 10:10 AM Dictation workstation:   [id number]                Assessment/Plan   Active Problems:  There are no active Hospital Problems.      This patient presents today for follow-up of her left upper arm AV fistula.  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  In the past, I was able to do angioplasty of her subclavian stenosis.  During her last attempt, I felt that the risk outweighed the benefit.  She states that her left arm swelling is tolerable and it has been worse in the past.  On physical exam, she has a slight thrill but mostly pulse.  She states that her dialysis sessions are  going relatively well.  She does have multiple collaterals that have developed.  Hopefully, we can continue to use this fistula as long as possible.  I had a long talk with the patient concerning the possibilities if this fistula is no longer usable.  I would like her to follow-up in 3 months      Adis Alford, DO

## 2024-02-27 ENCOUNTER — OFFICE VISIT (OUTPATIENT)
Dept: ORTHOPEDIC SURGERY | Facility: CLINIC | Age: 79
End: 2024-02-27
Payer: COMMERCIAL

## 2024-02-27 VITALS — HEIGHT: 63 IN | WEIGHT: 160 LBS | BODY MASS INDEX: 28.35 KG/M2

## 2024-02-27 DIAGNOSIS — M25.562 ACUTE PAIN OF LEFT KNEE: ICD-10-CM

## 2024-02-27 DIAGNOSIS — M25.462 EFFUSION OF LEFT KNEE: ICD-10-CM

## 2024-02-27 DIAGNOSIS — M17.12 PRIMARY OSTEOARTHRITIS OF LEFT KNEE: Primary | ICD-10-CM

## 2024-02-27 PROCEDURE — 1159F MED LIST DOCD IN RCRD: CPT | Performed by: FAMILY MEDICINE

## 2024-02-27 PROCEDURE — 1036F TOBACCO NON-USER: CPT | Performed by: FAMILY MEDICINE

## 2024-02-27 PROCEDURE — 1160F RVW MEDS BY RX/DR IN RCRD: CPT | Performed by: FAMILY MEDICINE

## 2024-02-27 PROCEDURE — 20610 DRAIN/INJ JOINT/BURSA W/O US: CPT | Performed by: FAMILY MEDICINE

## 2024-02-27 PROCEDURE — 99204 OFFICE O/P NEW MOD 45 MIN: CPT | Performed by: FAMILY MEDICINE

## 2024-02-27 PROCEDURE — 1125F AMNT PAIN NOTED PAIN PRSNT: CPT | Performed by: FAMILY MEDICINE

## 2024-02-27 RX ORDER — LIDOCAINE HYDROCHLORIDE 20 MG/ML
2 INJECTION, SOLUTION INFILTRATION; PERINEURAL
Status: COMPLETED | OUTPATIENT
Start: 2024-02-27 | End: 2024-02-27

## 2024-02-27 RX ORDER — TRIAMCINOLONE ACETONIDE 40 MG/ML
40 INJECTION, SUSPENSION INTRA-ARTICULAR; INTRAMUSCULAR
Status: COMPLETED | OUTPATIENT
Start: 2024-02-27 | End: 2024-02-27

## 2024-02-27 RX ADMIN — LIDOCAINE HYDROCHLORIDE 2 ML: 20 INJECTION, SOLUTION INFILTRATION; PERINEURAL at 11:17

## 2024-02-27 RX ADMIN — TRIAMCINOLONE ACETONIDE 40 MG: 40 INJECTION, SUSPENSION INTRA-ARTICULAR; INTRAMUSCULAR at 11:17

## 2024-02-27 NOTE — PROGRESS NOTES
"  History of Present Illness   Chief Complaint   Patient presents with    Left Knee - Pain     [facility]  TWISTED HER KNEE 2/6/24  +PAIN AND SWELLING       The patient is 79 y.o. female  here with a complaint of left knee pain.  Complicated medical history, does have end-stage renal disease on dialysis, diabetes which is well-controlled with recent A1c of 6.0 onset of symptoms approximately 3 weeks ago, patient says that she twisted her knee with a painful \"crack\" with pain over the medial aspect of her knee down her shin to her ankle with difficulty walking/bearing weight.  At baseline patient denies any history of any knee problems in the past but does have some lower extremity weakness and instability that she attributes to recovery from cervical spine surgery in March of last year, was in rehabilitation for several months and has been using a walker to assist in ambulation since.  Since her twisting injury 3 weeks ago she has developed some new more severe pain in the medial aspect of her knee, still using a walker to ambulate but with more difficulty.  She denies any significant knee problems in the past.  She did see the urgent care a little over a week ago, she had x-rays obtained that showed some mild to moderate degenerative changes as well as vascular calcifications, there was a joint effusion present but no acute fractures identified.  Recommended outpatient orthopedic follow-up.  She been using Tiger balm as well as some tramadol that she has for chronic pain, she has been using ice and heat with minimal change in symptoms, Tiger balm does seem to help some.  She admits to some swelling of her knee, decreased range of motion with bending her knee with pain.  Pain is exacerbated by weightbearing with her walker, feels better at rest.  She has been wearing an over-the-counter knee brace which seems to help as well.    Past Medical History:   Diagnosis Date    Personal history of diseases of the blood and " "blood-forming organs and certain disorders involving the immune mechanism     History of anemia    Personal history of other diseases of the circulatory system 08/16/2022    History of hypertension    Personal history of other diseases of urinary system 08/16/2022    History of kidney disease    Personal history of other drug therapy     History of anticoagulant therapy    Personal history of other endocrine, nutritional and metabolic disease 08/16/2022    History of hyperparathyroidism    Personal history of other endocrine, nutritional and metabolic disease 08/16/2022    History of hyperlipidemia       Medication Documentation Review Audit       Reviewed by Adis Alford DO (Physician) on 02/24/24 at 1019      Medication Order Taking? Sig Documenting Provider Last Dose Status   aspirin 81 mg EC tablet [id number] No Take 1 tablet (81 mg) by mouth once daily. Historical Provider, MD Taking Active   atorvastatin (Lipitor) 40 mg tablet [id number] No Take 1 tablet (40 mg) by mouth once daily at bedtime. Historical Provider, MD Taking Active   B complex-vitamin C-folic acid ([place] Caps) 1 mg capsule [id number] No once every 24 hours. Historical MD Odalys Taking Active   BD Insulin Syringe Ultra-Fine 0.5 mL 31 gauge x 5/16\" syringe [id number] No USE AS DIRECTED TWICE A DAY Historical Provider, MD Taking Active   calcium carbonate (Tums Ultra) 400 mg calcium (1,000 mg) chewable tablet [id number] No Chew. Historical Provider, MD Taking Active   cholecalciferol (Vitamin D3) 50 MCG (2000 UT) tablet [id number] No BID Historical MD Odalys Taking Active   cyanocobalamin (Vitamin B-12) 1,000 mcg tablet [id number] No Take 1 tablet (1,000 mcg) by mouth once daily. Historical Provider, MD Taking Active   ergocalciferol (Vitamin D-2) 1.25 MG (78621 UT) capsule [id number] No Take 1 capsule (1,250 mcg) by mouth 1 (one) time per week. Historical Provider, MD Taking Active   gabapentin (Neurontin) 100 mg capsule [id number] No TAKE 2 CAPSULE IN " THE MORNING (AFTER DIALYSIS) AND TAKE 2 CAPSULES AT BEDTIME DAILY. Historical Provider, MD Taking Active   insulin NPH and regular human (HumuLIN 70-30, NovoLIN 70-30) 100 unit/mL (70-30) injection [id number]  Inject under the skin 2 times a day before meals. Take as directed per insulin instructions. Historical Provider, MD  Active   levothyroxine (Synthroid, Levoxyl) 100 mcg tablet [id number] No  Historical Provider, MD Taking Active   lisinopril 40 mg tablet [id number] No Take 1 tablet (40 mg) by mouth once daily. Historical Provider, MD Taking Active   loperamide (Imodium) 2 mg capsule [id number] No Take by mouth. Historical Provider, MD Taking Active   metoprolol tartrate (Lopressor) 25 mg tablet [id number] No Take 0.5 tablets (12.5 mg) by mouth once daily. Historical Provider, MD Taking Active   NovoLIN N NPH U-100 Insulin 100 unit/mL injection [id number] No 15 UNITS SUBCUTANEOUSLY ONCE DAILY 90 DAYS Historical Provider, MD Taking Active   traMADol (Ultram) 50 mg tablet [id number] No Take 1 tablet (50 mg) by mouth every 6 hours if needed. Historical Provider, MD Taking Active                    Allergies   Allergen Reactions    Penicillins Anaphylaxis and Rash       Social History     Socioeconomic History    Marital status:      Spouse name: Not on file    Number of children: Not on file    Years of education: Not on file    Highest education level: Not on file   Occupational History    Not on file   Tobacco Use    Smoking status: Never    Smokeless tobacco: Never   Substance and Sexual Activity    Alcohol use: Never    Drug use: Never    Sexual activity: Not on file   Other Topics Concern    Not on file   Social History Narrative    Not on file     Social Determinants of Health     Financial Resource Strain: Not on file   Food Insecurity: No Food Insecurity (1/31/2024)    Hunger Vital Sign     Worried About Running Out of Food in the Last Year: Never true     Ran Out of Food in the Last Year: Never true    Transportation Needs: Not on file   Physical Activity: Not on file   Stress: Not on file   Social Connections: Not on file   Intimate Partner Violence: Not on file   Housing Stability: Not on file       Past Surgical History:   Procedure Laterality Date    BACK SURGERY  11/02/2017    Back Surgery    OTHER SURGICAL HISTORY  08/16/2022    Renal fistula repair    OTHER SURGICAL HISTORY  11/27/2017    Arteriovenous Surgery Creation Of A-V Fistula          Review of Systems   GENERAL: Negative  GI: Negative  MUSCULOSKELETAL: See HPI  SKIN: Negative  NEURO:  Negative     Physical Exam:    General/Constitutional: well appearing, no distress, appears stated age  HEENT: sclera clear  Respiratory: non labored breathing  Vascular: No edema, swelling or tenderness, except as noted in detailed exam.  Integumentary: No impressive skin lesions present, except as noted in detailed exam.  Neurological:  Alert and oriented   Psychological:  Normal mood and affect.  Musculoskeletal: Normal, except as noted in detailed exam and in HPI.  In wheelchair, unable to ambulate without her walker in the office today    Left knee: Limited exam as she was not able to get onto the exam table, skin envelope is intact, there is some focal tenderness to palpation at the medial aspect of her knee most prominent at the medial joint line, there is some mild tenderness at the medial tibia and medial femoral condyle.  There is a small joint effusion.  Range of motion is limited based on exam in her wheelchair, flexion to just past 90 degrees, she lacks around 5 degrees of active extension.  She does have some pain and weakness with the both resisted knee flexion and extension.  There is pain with varus and valgus stress.  There is no gross ligamentous laxity.       Imaging: X-rays of left knee from urgent care from 2/18/2024 independently reviewed, there is a joint effusion seen on lateral view, there is some mild to moderate degenerative changes with  joint space narrowing, osteophytosis more prominent medially.  There is vascular calcification      L Inj/Asp: L knee on 2/27/2024 11:17 AM  Indications: pain  Details: 22 G needle, superolateral approach  Medications: 40 mg triamcinolone acetonide 40 mg/mL; 2 mL lidocaine 20 mg/mL (2 %)  Outcome: tolerated well, no immediate complications  Procedure, treatment alternatives, risks and benefits explained, specific risks discussed. Consent was given by the patient. Immediately prior to procedure a time out was called to verify the correct patient, procedure, equipment, support staff and site/side marked as required. Patient was prepped and draped in the usual sterile fashion.             Assessment   1. Primary osteoarthritis of left knee        2. Acute pain of left knee        3. Effusion of left knee          Acute left knee pain with mild twisting injury, differential includes aggravation of underlying mild to moderate osteoarthritis, there is concern for potential medial meniscus tear contributing to her symptoms    Plan: Discussed differential diagnosis, further workup and treatment.  Patient is a poor surgical candidate given medical comorbidities, given this we did proceed with knee joint injection with Kenalog to hopefully help with pain, swelling in her knee.  She will continue with walker to assist in ambulation, tramadol as needed for pain control, over-the-counter knee brace that has been helpful.  I would like to see her back in 2 weeks for reassessment.

## 2024-03-13 ENCOUNTER — TELEPHONE (OUTPATIENT)
Dept: GASTROENTEROLOGY | Facility: CLINIC | Age: 79
End: 2024-03-13
Payer: COMMERCIAL

## 2024-03-14 NOTE — TELEPHONE ENCOUNTER
Spoke to daughter. Let her know studies are normal and reiterated for patient to take fiber daily

## 2024-03-19 ENCOUNTER — APPOINTMENT (OUTPATIENT)
Dept: ORTHOPEDIC SURGERY | Facility: CLINIC | Age: 79
End: 2024-03-19
Payer: COMMERCIAL

## 2024-03-26 ENCOUNTER — OFFICE VISIT (OUTPATIENT)
Dept: ORTHOPEDIC SURGERY | Facility: CLINIC | Age: 79
End: 2024-03-26
Payer: COMMERCIAL

## 2024-03-26 DIAGNOSIS — M17.12 PRIMARY OSTEOARTHRITIS OF LEFT KNEE: Primary | ICD-10-CM

## 2024-03-26 PROCEDURE — 1159F MED LIST DOCD IN RCRD: CPT | Performed by: FAMILY MEDICINE

## 2024-03-26 PROCEDURE — 99213 OFFICE O/P EST LOW 20 MIN: CPT | Performed by: FAMILY MEDICINE

## 2024-03-26 PROCEDURE — 1160F RVW MEDS BY RX/DR IN RCRD: CPT | Performed by: FAMILY MEDICINE

## 2024-03-26 PROCEDURE — 1036F TOBACCO NON-USER: CPT | Performed by: FAMILY MEDICINE

## 2024-03-26 NOTE — PROGRESS NOTES
History of Present Illness   Chief Complaint   Patient presents with    Left Knee - Follow-up       The patient is 79 y.o. female  here for follow-up left knee pain.  Patient does have history of end-stage renal disease on dialysis as well as well-controlled diabetes with A1c of 6.0.  Patient was seen by me 1 month ago, see previous note for full details.  In brief patient developed some sharp pain little less than 2 months ago with a twisting injury of her knee.  She did have x-rays which showed some mild to early moderate degenerative changes.  No history of any knee problems in the past, does use a walker at baseline secondary to some weakness that she relates to cervical spine issue but was needing walker more because of her knee.  She was treated with a knee joint injection with Kenalog at her initial visit with me 1 month ago.  She admits to near full relief of symptoms following an injection that lasted for almost a month, over the past few days she has noticed some mild recurrence of pain over the medial aspect of her knee but not to the same level of pain as she had before, she denies any new swelling, no locking or catching or instability.      Past Medical History:   Diagnosis Date    Personal history of diseases of the blood and blood-forming organs and certain disorders involving the immune mechanism     History of anemia    Personal history of other diseases of the circulatory system 08/16/2022    History of hypertension    Personal history of other diseases of urinary system 08/16/2022    History of kidney disease    Personal history of other drug therapy     History of anticoagulant therapy    Personal history of other endocrine, nutritional and metabolic disease 08/16/2022    History of hyperparathyroidism    Personal history of other endocrine, nutritional and metabolic disease 08/16/2022    History of hyperlipidemia       Medication Documentation Review Audit       Reviewed by Mack Liu MD  "(Physician) on 02/27/24 at 1116      Medication Order Taking? Sig Documenting Provider Last Dose Status   aspirin 81 mg EC tablet [id number] No Take 1 tablet (81 mg) by mouth once daily. Historical MD Odalys Taking Active   atorvastatin (Lipitor) 40 mg tablet [id number] No Take 1 tablet (40 mg) by mouth once daily at bedtime. Historical MD Odalys Taking Active   B complex-vitamin C-folic acid (Tre Caps) 1 mg capsule [id number] No once every 24 hours. Historical MD Odalys Taking Active   BD Insulin Syringe Ultra-Fine 0.5 mL 31 gauge x 5/16\" syringe [id number] No USE AS DIRECTED TWICE A DAY Historical MD Odalys Taking Active   calcium carbonate (Tums Ultra) 400 mg calcium (1,000 mg) chewable tablet [id number] No Chew. Historical Provider, MD Taking Active   cholecalciferol (Vitamin D3) 50 MCG (2000 UT) tablet [id number] No BID Historical MD Odalys Taking Active   cyanocobalamin (Vitamin B-12) 1,000 mcg tablet [id number] No Take 1 tablet (1,000 mcg) by mouth once daily. Historical Provider, MD Taking Active   ergocalciferol (Vitamin D-2) 1.25 MG (24690 UT) capsule [id number] No Take 1 capsule (1,250 mcg) by mouth 1 (one) time per week. Historical MD Odalys Taking Active   gabapentin (Neurontin) 100 mg capsule [id number] No TAKE 2 CAPSULE IN THE MORNING (AFTER DIALYSIS) AND TAKE 2 CAPSULES AT BEDTIME DAILY. Historical Provider, MD Taking Active   insulin NPH and regular human (HumuLIN 70-30, NovoLIN 70-30) 100 unit/mL (70-30) injection [id number]  Inject under the skin 2 times a day before meals. Take as directed per insulin instructions. Historical Provider, MD  Active   levothyroxine (Synthroid, Levoxyl) 100 mcg tablet [id number] No  Historical Provider, MD Taking Active   lisinopril 40 mg tablet [id number] No Take 1 tablet (40 mg) by mouth once daily. Historical MD Odalys Taking Active   loperamide (Imodium) 2 mg capsule [id number] No Take by mouth. Historical Provider, MD Taking Active   metoprolol tartrate " (Lopressor) 25 mg tablet [id number] No Take 0.5 tablets (12.5 mg) by mouth once daily. Historical Provider, MD Taking Active   NovoLIN N NPH U-100 Insulin 100 unit/mL injection [id number] No 15 UNITS SUBCUTANEOUSLY ONCE DAILY 90 DAYS Historical Provider, MD Taking Active   traMADol (Ultram) 50 mg tablet [id number] No Take 1 tablet (50 mg) by mouth every 6 hours if needed. Historical Provider, MD Taking Active                    Allergies   Allergen Reactions    Penicillins Anaphylaxis and Rash       Social History     Socioeconomic History    Marital status:      Spouse name: Not on file    Number of children: Not on file    Years of education: Not on file    Highest education level: Not on file   Occupational History    Not on file   Tobacco Use    Smoking status: Never    Smokeless tobacco: Never   Substance and Sexual Activity    Alcohol use: Never    Drug use: Never    Sexual activity: Not on file   Other Topics Concern    Not on file   Social History Narrative    Not on file     Social Determinants of Health     Financial Resource Strain: Not on file   Food Insecurity: No Food Insecurity (1/31/2024)    Hunger Vital Sign     Worried About Running Out of Food in the Last Year: Never true     Ran Out of Food in the Last Year: Never true   Transportation Needs: Not on file   Physical Activity: Not on file   Stress: Not on file   Social Connections: Not on file   Intimate Partner Violence: Not on file   Housing Stability: Not on file       Past Surgical History:   Procedure Laterality Date    BACK SURGERY  11/02/2017    Back Surgery    OTHER SURGICAL HISTORY  08/16/2022    Renal fistula repair    OTHER SURGICAL HISTORY  11/27/2017    Arteriovenous Surgery Creation Of A-V Fistula          Review of Systems   GENERAL: Negative  GI: Negative  MUSCULOSKELETAL: See HPI  SKIN: Negative  NEURO:  Negative     Physical Exam:    General/Constitutional: well appearing, no distress, appears stated age  HEENT: sclera  clear  Respiratory: non labored breathing  Vascular: No edema, swelling or tenderness, except as noted in detailed exam.  Integumentary: No impressive skin lesions present, except as noted in detailed exam.  Neurological:  Alert and oriented   Psychological:  Normal mood and affect.  Musculoskeletal: Normal, except as noted in detailed exam and in HPI.  In wheelchair, unable to ambulate without her walker in the office today    Left knee: Normal appearance, no skin changes, no joint effusion is present.  There is some mild residual tenderness at the medial joint line.,  Range of motion is improved, she can fully extend, flexion to at least 100 degrees but limited by exam in her wheelchair today..  Mild pain but no weakness with resisted knee flexion and extension.  Stable to varus and valgus stress.         Imaging: No new imaging today          Assessment   1. Primary osteoarthritis of left knee          Right knee pain thought to be aggravation of underlying osteoarthritis, good response to corticosteroid injection 1 month ago with some mild recurrence of pain over the past few days but still doing significantly better than she was 1 month ago    Plan: Discussed further workup and treatment with patient.  Patient was interested in conservative management.  Stated that we could not do another knee injection for at least another 2 to 3 months.  We discussed role of gel injections as alternative noninvasive option which she said she would consider if she has worsening pain over the next few weeks.  At this time she will follow-up as symptoms dictate, I did tell her that she could reach out to the office if she would like to pursue gel injections as a treatment option.

## 2024-05-23 ENCOUNTER — OFFICE VISIT (OUTPATIENT)
Dept: VASCULAR SURGERY | Facility: CLINIC | Age: 79
End: 2024-05-23
Payer: COMMERCIAL

## 2024-05-23 VITALS
WEIGHT: 160 LBS | HEIGHT: 63 IN | HEART RATE: 65 BPM | SYSTOLIC BLOOD PRESSURE: 118 MMHG | OXYGEN SATURATION: 98 % | DIASTOLIC BLOOD PRESSURE: 68 MMHG | BODY MASS INDEX: 28.35 KG/M2

## 2024-05-23 DIAGNOSIS — R60.0 LOCALIZED EDEMA: ICD-10-CM

## 2024-05-23 DIAGNOSIS — T82.858D ARTERIOVENOUS FISTULA STENOSIS, SUBSEQUENT ENCOUNTER: Primary | ICD-10-CM

## 2024-05-23 PROCEDURE — 3074F SYST BP LT 130 MM HG: CPT | Performed by: SURGERY

## 2024-05-23 PROCEDURE — 99214 OFFICE O/P EST MOD 30 MIN: CPT | Performed by: SURGERY

## 2024-05-23 PROCEDURE — 1160F RVW MEDS BY RX/DR IN RCRD: CPT | Performed by: SURGERY

## 2024-05-23 PROCEDURE — 3078F DIAST BP <80 MM HG: CPT | Performed by: SURGERY

## 2024-05-23 PROCEDURE — 1159F MED LIST DOCD IN RCRD: CPT | Performed by: SURGERY

## 2024-05-27 NOTE — PROGRESS NOTES
"Lyly Draper is a 79 y.o. female     Subjective   This patient presents today for follow-up of her complicated left arm AV fistula.  She states that she has been getting increasing development of varicosities of her left upper extremity.  Her swelling is also slightly worse.  She denies any significant pain associated with her left upper extremity.  She states that she is getting full dialysis treatments.  She is currently 79 years old.  She denies any fever chills nausea vomiting or headache         Objective     Vitals:    05/23/24 0900   BP: 118/68   Pulse: 65   SpO2: 98%      Physical Exam  This patient is alert and oriented x 3.  She is in no acute distress.  Head is normocephalic.  Pupils are equal and reactive to light accommodation.  Neck is soft and supple without palpable lymph nodes.  Heart is regular rate.  Lungs are clear.  Abdomen is soft with positive bowel sounds there is no pain on palpation.  Right upper extremity has adequate range of motion.  Her left upper extremity has some decreased range of motion.  Her lower extremities have some decreased range of motion.  She has palpable radial and popliteal pulses.  Skin turgor seems adequate in her lower extremities and decreased in her left upper extremity.  Her left upper extremity does have persistent swelling with varicosities involving her left upper extremity including her forearm area.  Her fistula has a slight thrill with mostly pulse.  Psychologically she appears to be acting appropriately  Blood pressure 118/68, pulse 65, height 1.6 m (5' 3\"), weight 72.6 kg (160 lb), SpO2 98%.            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) 12/12/2023    Cervical radiculitis 12/12/2023    Chronic neck pain 12/12/2023    Diabetes mellitus (Multi) 12/12/2023    (HFpEF) heart " "failure with preserved ejection fraction (Multi) 12/12/2023    Diastolic congestive heart failure (Multi) 12/12/2023    Edema 12/12/2023    Elevated troponin 12/12/2023    Hemodialysis access, fistula mature (CMS-[facility]) 12/12/2023    Hemodialysis-associated hypotension 12/12/2023    History of thrombocytopenia 12/12/2023    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    Hypercholesterolemia 12/12/2023    Hyperphosphatemia 12/12/2023    Hypertension, benign 12/12/2023    Low vitamin D level 12/12/2023    Lumbar foraminal stenosis 12/12/2023    Lumbar neuritis 12/12/2023    Lumbar spondylosis 12/12/2023    Neuropathy 12/12/2023    Nondisplaced fracture of proximal end of right tibia 12/12/2023    Pancytopenia (Multi) 12/12/2023    Right knee pain 12/12/2023    Right median nerve neuropathy 12/12/2023    Spinal cord compression (Multi) 12/12/2023    Sprain of medial collateral ligament of right knee 12/12/2023    Subclavian vein obstruction (Multi) 12/12/2023    Subsequent non-ST elevation (NSTEMI) myocardial infarction (Multi) 12/12/2023    Venous hypertension 12/12/2023    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    End stage renal failure on dialysis (Multi) 07/19/2023    Functional diarrhea 07/19/2023    Acquired hypothyroidism 06/14/2023    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) 06/14/2023    Primary hypertension 06/14/2023    Diarrhea of presumed infectious origin 06/14/2023    Irritable bowel syndrome with diarrhea 06/14/2023          Current Outpatient Medications:     aspirin 81 mg EC tablet, Take 1 tablet (81 mg) by mouth once daily., Disp: , Rfl:     atorvastatin (Lipitor) 40 mg tablet, Take 1 tablet (40 mg) by mouth once daily at bedtime., Disp: , Rfl:     B complex-vitamin C-folic acid ([place] Caps) 1 mg capsule, once every 24 hours., Disp: , Rfl:     BD Insulin Syringe Ultra-Fine 0.5 mL 31 gauge x 5/16\" syringe, USE AS DIRECTED TWICE A DAY, Disp: , Rfl:     calcium carbonate (Tums " Ultra) 400 mg calcium (1,000 mg) chewable tablet, Chew., Disp: , Rfl:     cholecalciferol (Vitamin D3) 50 MCG (2000 UT) tablet, BID, Disp: , Rfl:     cyanocobalamin (Vitamin B-12) 1,000 mcg tablet, Take 1 tablet (1,000 mcg) by mouth once daily., Disp: , Rfl:     ergocalciferol (Vitamin D-2) 1.25 MG (85200 UT) capsule, Take 1 capsule (1,250 mcg) by mouth 1 (one) time per week., Disp: , Rfl:     gabapentin (Neurontin) 100 mg capsule, TAKE 2 CAPSULE IN THE MORNING (AFTER DIALYSIS) AND TAKE 2 CAPSULES AT BEDTIME DAILY., Disp: , Rfl:     insulin NPH and regular human (HumuLIN 70-30, NovoLIN 70-30) 100 unit/mL (70-30) injection, Inject under the skin 2 times a day before meals. Take as directed per insulin instructions., Disp: , Rfl:     levothyroxine (Synthroid, Levoxyl) 100 mcg tablet, , Disp: , Rfl:     lisinopril 40 mg tablet, Take 1 tablet (40 mg) by mouth once daily., Disp: , Rfl:     loperamide (Imodium) 2 mg capsule, Take by mouth., Disp: , Rfl:     metoprolol tartrate (Lopressor) 25 mg tablet, Take 0.5 tablets (12.5 mg) by mouth once daily., Disp: , Rfl:     NovoLIN N NPH U-100 Insulin 100 unit/mL injection, 15 UNITS SUBCUTANEOUSLY ONCE DAILY 90 DAYS, Disp: , Rfl:     traMADol (Ultram) 50 mg tablet, Take 1 tablet (50 mg) by mouth every 6 hours if needed., Disp: , Rfl:      Lab Results   Component Value Date    WBC 6.0 03/13/2023    HGB 9.0 (L) 03/13/2023    HCT 28.0 (L) 03/13/2023     (L) 03/13/2023    CHOL 110 03/06/2023    TRIG 129 03/06/2023    HDL 30.7 (A) 03/06/2023    ALT 11 04/12/2022    AST 13 04/12/2022     (L) 03/13/2023    K 4.2 03/13/2023    CL 92 (L) 03/13/2023    CREATININE 6.22 (H) 03/13/2023    BUN 60 (H) 03/13/2023    CO2 29 03/13/2023    TSH 5.26 (H) 11/07/2023    INR 2.0 (H) 03/07/2023    HGBA1C 6.0 (H) 11/07/2023    HGBA1C 6.0 (H) 11/07/2023       XR knee left 4+ views    Result Date: 2/18/2024  Interpreted By:  Jazmyne Tavarez, STUDY: XR KNEE LEFT 4+ VIEWS;  2/18/2024 9:34 am    INDICATION: Signs/Symptoms:pain s/p injury 2 weeks .   COMPARISON: None.   ACCESSION NUMBER(S): [id number]   ORDERING CLINICIAN: EASTON DOAN   FINDINGS: Four views of the left knee obtained.   Osteopenia. No definite acute fracture or osseous displacement. Mild medial compartment narrowing. Patellar spurring. Small suprapatellar effusion and probable anterior/suprapatellar soft tissue swelling. Extensive presumed vascular calcifications in the posterior soft tissues.       No evidence of fracture. Joint effusion and probable anterior/suprapatellar soft tissue swelling. Mild productive/degenerative changes.   MACRO: None.   Signed by: Jazmyne Tavarez 2/18/2024 10:10 AM Dictation workstation:   [id number]    XR knee left 4+ views    Result Date: 2/18/2024  xr left knee 4 views Interpreted By:  Jazmyne Tavarez, STUDY: XR KNEE LEFT 4+ VIEWS;  2/18/2024 9:34 am    INDICATION: Signs/Symptoms:pain s/p injury 2 weeks .    COMPARISON: None.    ACCESSION NUMBER(S): [id number]    ORDERING CLINICIAN: EASTON DOAN    FINDINGS: Four views of the left knee obtained.    Osteopenia. No definite acute fracture or osseous displacement. Mild medial compartment narrowing. Patellar spurring. Small suprapatellar effusion and probable anterior/suprapatellar soft tissue swelling. Extensive presumed vascular calcifications in the posterior soft tissues.    IMPRESSION: No evidence of fracture. Joint effusion and probable anterior/suprapatellar soft tissue swelling. Mild productive/degenerative changes.    MACRO: None.    Signed by: Jazmyne Tavarez 2/18/2024 10:10 AM Dictation workstation:   [id number]                Assessment/Plan   Active Problems:  There are no active Hospital Problems.      This patient presents today for follow-up of her left upper arm AV fistula.  She states that she is getting full dialysis treatments.  She denies any significant pain in her left arm.  She does have some increased swelling and also increasing  development of varicosities over left upper extremity including her forearm area.  Her fistula has a slight thrill with mostly pulse.  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  The last time she had a fistulogram I was unable to traverse through her 100% blockages.  She does have multiple collaterals identified on her previous fistulogram's.  We continued to have discussion concerning plan for continuing her dialysis.  For now, I am encouraging her to continue to use her left arm for her dialysis and she is getting full treatments.  If her arm continues to get worse and she is not getting adequate dialysis treatments, I would consider ligating the fistula and placing a permacath and then hopefully a permanent access in her right arm.  I would like her to follow-up in 3 months      Adis Alford, DO

## 2024-06-11 ENCOUNTER — OFFICE VISIT (OUTPATIENT)
Dept: VASCULAR SURGERY | Facility: CLINIC | Age: 79
End: 2024-06-11
Payer: COMMERCIAL

## 2024-06-11 VITALS
DIASTOLIC BLOOD PRESSURE: 70 MMHG | OXYGEN SATURATION: 96 % | SYSTOLIC BLOOD PRESSURE: 132 MMHG | HEART RATE: 62 BPM | BODY MASS INDEX: 28.35 KG/M2 | HEIGHT: 63 IN | WEIGHT: 160 LBS

## 2024-06-11 DIAGNOSIS — T82.858D ARTERIOVENOUS FISTULA STENOSIS, SUBSEQUENT ENCOUNTER: Primary | ICD-10-CM

## 2024-06-11 PROCEDURE — 1159F MED LIST DOCD IN RCRD: CPT | Performed by: SURGERY

## 2024-06-11 PROCEDURE — 1160F RVW MEDS BY RX/DR IN RCRD: CPT | Performed by: SURGERY

## 2024-06-11 PROCEDURE — 99214 OFFICE O/P EST MOD 30 MIN: CPT | Performed by: SURGERY

## 2024-06-11 PROCEDURE — 3075F SYST BP GE 130 - 139MM HG: CPT | Performed by: SURGERY

## 2024-06-11 PROCEDURE — 1036F TOBACCO NON-USER: CPT | Performed by: SURGERY

## 2024-06-11 PROCEDURE — 3078F DIAST BP <80 MM HG: CPT | Performed by: SURGERY

## 2024-06-12 NOTE — PROGRESS NOTES
"Lyly Draper is a 79 y.o. female     Subjective   This patient presents to the office as an acute visit for evaluation of her left upper arm AV fistula.  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  She denies any fever chills nausea vomiting or headache.  She states that dialysis is well.         Objective     Vitals:    06/11/24 1300   BP: 132/70   Pulse: 62   SpO2: 96%      Physical Exam  This patient is alert and oriented x 3.  She is in no acute distress.  Head is normocephalic.  Pupils are equal and reactive to light and accommodation.  Neck is soft and supple without palpable lymph nodes.  Heart is regular rate.  Lungs are relatively clear.  Abdomen is soft with positive bowel sounds there is no pain on palpation.  Her right upper extremity seems to have adequate range of motion.  Her left upper extremity has some decreased range of motion as well as her lower extremities.  Her left upper arm AV fistula is mostly pulse.  There is a relatively thin scab over one of the aneurysms involving her fistula in the mid humeral area.  The scan is not pulsatile.  There is dark redness around the area.  On palpation.  The redness does not lyudmila  Blood pressure 132/70, pulse 62, height 1.6 m (5' 3\"), weight 72.6 kg (160 lb), SpO2 96%.            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) 12/12/2023    Cervical radiculitis 12/12/2023    Chronic neck pain 12/12/2023    Diabetes mellitus (Multi) 12/12/2023    (HFpEF) heart failure with preserved ejection fraction (Multi) 12/12/2023    Diastolic congestive heart failure (Multi) 12/12/2023    Edema 12/12/2023    Elevated troponin 12/12/2023    Hemodialysis access, fistula mature (CMS-[facility]) 12/12/2023    Hemodialysis-associated " "hypotension 12/12/2023    History of thrombocytopenia 12/12/2023    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    Hypercholesterolemia 12/12/2023    Hyperphosphatemia 12/12/2023    Hypertension, benign 12/12/2023    Low vitamin D level 12/12/2023    Lumbar foraminal stenosis 12/12/2023    Lumbar neuritis 12/12/2023    Lumbar spondylosis 12/12/2023    Neuropathy 12/12/2023    Nondisplaced fracture of proximal end of right tibia 12/12/2023    Pancytopenia (Multi) 12/12/2023    Right knee pain 12/12/2023    Right median nerve neuropathy 12/12/2023    Spinal cord compression (Multi) 12/12/2023    Sprain of medial collateral ligament of right knee 12/12/2023    Subclavian vein obstruction (Multi) 12/12/2023    Subsequent non-ST elevation (NSTEMI) myocardial infarction (Multi) 12/12/2023    Venous hypertension 12/12/2023    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    End stage renal failure on dialysis (Multi) 07/19/2023    Functional diarrhea 07/19/2023    Acquired hypothyroidism 06/14/2023    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) 06/14/2023    Primary hypertension 06/14/2023    Diarrhea of presumed infectious origin 06/14/2023    Irritable bowel syndrome with diarrhea 06/14/2023          Current Outpatient Medications:     aspirin 81 mg EC tablet, Take 1 tablet (81 mg) by mouth once daily., Disp: , Rfl:     atorvastatin (Lipitor) 40 mg tablet, Take 1 tablet (40 mg) by mouth once daily at bedtime., Disp: , Rfl:     B complex-vitamin C-folic acid ([place] Caps) 1 mg capsule, once every 24 hours., Disp: , Rfl:     BD Insulin Syringe Ultra-Fine 0.5 mL 31 gauge x 5/16\" syringe, USE AS DIRECTED TWICE A DAY, Disp: , Rfl:     calcium carbonate (Tums Ultra) 400 mg calcium (1,000 mg) chewable tablet, Chew., Disp: , Rfl:     cholecalciferol (Vitamin D3) 50 MCG (2000 UT) tablet, BID, Disp: , Rfl:     cyanocobalamin (Vitamin B-12) 1,000 mcg tablet, Take 1 tablet (1,000 mcg) by mouth once daily., Disp: , " Rfl:     ergocalciferol (Vitamin D-2) 1.25 MG (44394 UT) capsule, Take 1 capsule (1,250 mcg) by mouth 1 (one) time per week., Disp: , Rfl:     gabapentin (Neurontin) 100 mg capsule, TAKE 2 CAPSULE IN THE MORNING (AFTER DIALYSIS) AND TAKE 2 CAPSULES AT BEDTIME DAILY., Disp: , Rfl:     insulin NPH and regular human (HumuLIN 70-30, NovoLIN 70-30) 100 unit/mL (70-30) injection, Inject under the skin 2 times a day before meals. Take as directed per insulin instructions., Disp: , Rfl:     levothyroxine (Synthroid, Levoxyl) 100 mcg tablet, , Disp: , Rfl:     lisinopril 40 mg tablet, Take 1 tablet (40 mg) by mouth once daily., Disp: , Rfl:     loperamide (Imodium) 2 mg capsule, Take by mouth., Disp: , Rfl:     metoprolol tartrate (Lopressor) 25 mg tablet, Take 0.5 tablets (12.5 mg) by mouth once daily., Disp: , Rfl:     NovoLIN N NPH U-100 Insulin 100 unit/mL injection, 15 UNITS SUBCUTANEOUSLY ONCE DAILY 90 DAYS, Disp: , Rfl:     traMADol (Ultram) 50 mg tablet, Take 1 tablet (50 mg) by mouth every 6 hours if needed., Disp: , Rfl:      Lab Results   Component Value Date    WBC 6.0 03/13/2023    HGB 9.0 (L) 03/13/2023    HCT 28.0 (L) 03/13/2023     (L) 03/13/2023    CHOL 110 03/06/2023    TRIG 129 03/06/2023    HDL 30.7 (A) 03/06/2023    ALT 11 04/12/2022    AST 13 04/12/2022     (L) 03/13/2023    K 4.2 03/13/2023    CL 92 (L) 03/13/2023    CREATININE 6.22 (H) 03/13/2023    BUN 60 (H) 03/13/2023    CO2 29 03/13/2023    TSH 5.26 (H) 11/07/2023    INR 2.0 (H) 03/07/2023    HGBA1C 6.0 (H) 11/07/2023    HGBA1C 6.0 (H) 11/07/2023       XR knee left 4+ views    Result Date: 2/18/2024  Interpreted By:  Jazmyne Tavarez, STUDY: XR KNEE LEFT 4+ VIEWS;  2/18/2024 9:34 am   INDICATION: Signs/Symptoms:pain s/p injury 2 weeks .   COMPARISON: None.   ACCESSION NUMBER(S): [id number]   ORDERING CLINICIAN: EASTON DOAN   FINDINGS: Four views of the left knee obtained.   Osteopenia. No definite acute fracture or osseous  displacement. Mild medial compartment narrowing. Patellar spurring. Small suprapatellar effusion and probable anterior/suprapatellar soft tissue swelling. Extensive presumed vascular calcifications in the posterior soft tissues.       No evidence of fracture. Joint effusion and probable anterior/suprapatellar soft tissue swelling. Mild productive/degenerative changes.   MACRO: None.   Signed by: Jazmyne Tavarez 2/18/2024 10:10 AM Dictation workstation:   [id number]    XR knee left 4+ views    Result Date: 2/18/2024  xr left knee 4 views Interpreted By:  Jazmyne Tavarez, STUDY: XR KNEE LEFT 4+ VIEWS;  2/18/2024 9:34 am    INDICATION: Signs/Symptoms:pain s/p injury 2 weeks .    COMPARISON: None.    ACCESSION NUMBER(S): [id number]    ORDERING CLINICIAN: EASTON DOAN    FINDINGS: Four views of the left knee obtained.    Osteopenia. No definite acute fracture or osseous displacement. Mild medial compartment narrowing. Patellar spurring. Small suprapatellar effusion and probable anterior/suprapatellar soft tissue swelling. Extensive presumed vascular calcifications in the posterior soft tissues.    IMPRESSION: No evidence of fracture. Joint effusion and probable anterior/suprapatellar soft tissue swelling. Mild productive/degenerative changes.    MACRO: None.    Signed by: Jazmyne Tavarez 2/18/2024 10:10 AM Dictation workstation:   [id number]                Assessment/Plan   Active Problems:  There are no active Hospital Problems.      This patient was seen in the office as an acute visit for evaluation of her left upper arm AV fistula and concern for a scab.  The scab is over one of the aneurysms.  The base of the scab seems relatively solid.  There is a thin layer of skin necrosis.  She did receive vancomycin yesterday after her dialysis.  She does have dark redness around the scab that does not lyudmila.  At this time, I placed a pad over the scan and placed paper tape to hold it in place.  I would like this patient to  stay in touch with my office concerning any worsening of this superficial scab.  Again, I do feel that the vein is solid beneath this.  At some point, he may very well excise the ulcer.  I would like her to follow-up with me in 1 week.      Adis Alford, DO

## 2024-06-18 ENCOUNTER — APPOINTMENT (OUTPATIENT)
Dept: VASCULAR SURGERY | Facility: CLINIC | Age: 79
End: 2024-06-18
Payer: COMMERCIAL

## 2024-06-18 ENCOUNTER — OFFICE VISIT (OUTPATIENT)
Dept: VASCULAR SURGERY | Facility: CLINIC | Age: 79
End: 2024-06-18
Payer: COMMERCIAL

## 2024-06-18 VITALS
BODY MASS INDEX: 28.35 KG/M2 | DIASTOLIC BLOOD PRESSURE: 72 MMHG | HEIGHT: 63 IN | SYSTOLIC BLOOD PRESSURE: 136 MMHG | WEIGHT: 160 LBS | HEART RATE: 76 BPM | OXYGEN SATURATION: 98 %

## 2024-06-18 DIAGNOSIS — T82.858D ARTERIOVENOUS FISTULA STENOSIS, SUBSEQUENT ENCOUNTER: Primary | ICD-10-CM

## 2024-06-18 PROCEDURE — 1159F MED LIST DOCD IN RCRD: CPT | Performed by: SURGERY

## 2024-06-18 PROCEDURE — 99214 OFFICE O/P EST MOD 30 MIN: CPT | Performed by: SURGERY

## 2024-06-18 PROCEDURE — 3075F SYST BP GE 130 - 139MM HG: CPT | Performed by: SURGERY

## 2024-06-18 PROCEDURE — 1160F RVW MEDS BY RX/DR IN RCRD: CPT | Performed by: SURGERY

## 2024-06-18 PROCEDURE — 3078F DIAST BP <80 MM HG: CPT | Performed by: SURGERY

## 2024-06-25 ENCOUNTER — APPOINTMENT (OUTPATIENT)
Dept: VASCULAR SURGERY | Facility: CLINIC | Age: 79
End: 2024-06-25
Payer: COMMERCIAL

## 2024-06-25 VITALS
HEIGHT: 63 IN | HEART RATE: 67 BPM | DIASTOLIC BLOOD PRESSURE: 60 MMHG | WEIGHT: 160 LBS | BODY MASS INDEX: 28.35 KG/M2 | OXYGEN SATURATION: 92 % | SYSTOLIC BLOOD PRESSURE: 132 MMHG

## 2024-06-25 DIAGNOSIS — L03.114 CELLULITIS OF LEFT UPPER EXTREMITY: Primary | ICD-10-CM

## 2024-06-25 DIAGNOSIS — R60.0 LOCALIZED EDEMA: ICD-10-CM

## 2024-06-25 DIAGNOSIS — T82.858D ARTERIOVENOUS FISTULA STENOSIS, SUBSEQUENT ENCOUNTER: ICD-10-CM

## 2024-06-25 PROCEDURE — 99214 OFFICE O/P EST MOD 30 MIN: CPT | Performed by: SURGERY

## 2024-06-25 PROCEDURE — 3078F DIAST BP <80 MM HG: CPT | Performed by: SURGERY

## 2024-06-25 PROCEDURE — 1159F MED LIST DOCD IN RCRD: CPT | Performed by: SURGERY

## 2024-06-25 PROCEDURE — 1160F RVW MEDS BY RX/DR IN RCRD: CPT | Performed by: SURGERY

## 2024-06-25 PROCEDURE — 1036F TOBACCO NON-USER: CPT | Performed by: SURGERY

## 2024-06-25 PROCEDURE — 3075F SYST BP GE 130 - 139MM HG: CPT | Performed by: SURGERY

## 2024-06-25 RX ORDER — CEPHALEXIN 250 MG/1
250 CAPSULE ORAL 2 TIMES DAILY
Qty: 14 CAPSULE | Refills: 0 | Status: SHIPPED | OUTPATIENT
Start: 2024-06-25 | End: 2024-07-02

## 2024-06-25 NOTE — H&P (VIEW-ONLY)
"Lyly Draper is a 79 y.o. female     Subjective   This patient presents today for follow-up of her left upper arm AV fistula.  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  She states that this was not from a needlestick.  I have been following her closely over the last couple of weeks.  She denies any fever or chills or nausea or vomiting.         Objective     Vitals:    06/25/24 1100   BP: 132/60   Pulse: 67   SpO2: 92%      Physical Exam  This patient is alert and oriented x 3.  She is in no acute distress.  Head is normocephalic.  Pupils are equal and reactive to light and accommodation.  Neck is soft and supple without palpable lymph nodes.  Heart is regular rate.  Lungs are relatively clear.  Abdomen is soft with positive bowel sounds there is no pain on palpation.  Her right upper extremity seems to have adequate range of motion.  Her left upper extremity has some decreased range of motion.  Her left upper extremity is much larger than her right.  She does have mostly pulse in her left arm AV fistula.  There is a dry scab with some skin discoloration involving one of her aneurysms of her fistula.  The scab does not look pulsatile.  Her lower extremities have decreased range of motion.  She does have palpable brachial radial and femoral pulses.  Skin turgor is adequate.  Psychologically she seems to be acting appropriately  Blood pressure 132/60, pulse 67, height 1.6 m (5' 3\"), weight 72.6 kg (160 lb), SpO2 92%.            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    Hypercholesterolemia 12/12/2023    Hyperphosphatemia 12/12/2023    Hypertension, benign 12/12/2023    Low vitamin D level 12/12/2023    Lumbar foraminal stenosis 12/12/2023    Lumbar neuritis 12/12/2023    Lumbar spondylosis 12/12/2023    Neuropathy 12/12/2023    Nondisplaced fracture of proximal end of right tibia 12/12/2023    Pancytopenia (Multi) 12/12/2023    Right knee pain 12/12/2023    Right median nerve neuropathy 12/12/2023    Spinal cord compression (Multi) 12/12/2023    Sprain of medial collateral ligament of right knee 12/12/2023    Subclavian vein obstruction (Multi) 12/12/2023    Subsequent non-ST elevation (NSTEMI) myocardial infarction (Multi) 12/12/2023    Venous hypertension 12/12/2023    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    End stage renal failure on dialysis (Multi) 07/19/2023    Functional diarrhea 07/19/2023    Acquired hypothyroidism 06/14/2023    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) 06/14/2023    Primary hypertension 06/14/2023    Diarrhea of presumed infectious origin 06/14/2023    Irritable bowel syndrome with diarrhea 06/14/2023          Current Outpatient Medications:     aspirin 81 mg EC tablet, Take 1 tablet (81 mg) by mouth once daily., Disp: , Rfl:     atorvastatin (Lipitor) 40 mg tablet, Take 1 tablet (40 mg) by mouth once daily at bedtime., Disp: , Rfl:     B complex-vitamin C-folic acid (Tre Caps) 1 mg capsule, once every 24 hours., Disp: , Rfl:     BD Insulin Syringe Ultra-Fine 0.5 mL 31 gauge x 5/16\" syringe, USE AS DIRECTED TWICE A DAY, Disp: , Rfl:     calcium carbonate (Tums Ultra) 400 mg calcium (1,000 mg) chewable tablet, Chew., Disp: , Rfl:     cephalexin (Keflex) 250 mg capsule, Take 1 " capsule (250 mg) by mouth 2 times a day for 7 days., Disp: 14 capsule, Rfl: 0    cholecalciferol (Vitamin D3) 50 MCG (2000 UT) tablet, BID, Disp: , Rfl:     cyanocobalamin (Vitamin B-12) 1,000 mcg tablet, Take 1 tablet (1,000 mcg) by mouth once daily., Disp: , Rfl:     ergocalciferol (Vitamin D-2) 1.25 MG (72193 UT) capsule, Take 1 capsule (1,250 mcg) by mouth 1 (one) time per week., Disp: , Rfl:     gabapentin (Neurontin) 100 mg capsule, TAKE 2 CAPSULE IN THE MORNING (AFTER DIALYSIS) AND TAKE 2 CAPSULES AT BEDTIME DAILY., Disp: , Rfl:     insulin NPH and regular human (HumuLIN 70-30, NovoLIN 70-30) 100 unit/mL (70-30) injection, Inject under the skin 2 times a day before meals. Take as directed per insulin instructions., Disp: , Rfl:     levothyroxine (Synthroid, Levoxyl) 100 mcg tablet, , Disp: , Rfl:     lisinopril 40 mg tablet, Take 1 tablet (40 mg) by mouth once daily., Disp: , Rfl:     loperamide (Imodium) 2 mg capsule, Take by mouth., Disp: , Rfl:     metoprolol tartrate (Lopressor) 25 mg tablet, Take 0.5 tablets (12.5 mg) by mouth once daily., Disp: , Rfl:     NovoLIN N NPH U-100 Insulin 100 unit/mL injection, 15 UNITS SUBCUTANEOUSLY ONCE DAILY 90 DAYS, Disp: , Rfl:     traMADol (Ultram) 50 mg tablet, Take 1 tablet (50 mg) by mouth every 6 hours if needed., Disp: , Rfl:      Lab Results   Component Value Date    WBC 6.0 03/13/2023    HGB 9.0 (L) 03/13/2023    HCT 28.0 (L) 03/13/2023     (L) 03/13/2023    CHOL 110 03/06/2023    TRIG 129 03/06/2023    HDL 30.7 (A) 03/06/2023    ALT 11 04/12/2022    AST 13 04/12/2022     (L) 03/13/2023    K 4.2 03/13/2023    CL 92 (L) 03/13/2023    CREATININE 6.22 (H) 03/13/2023    BUN 60 (H) 03/13/2023    CO2 29 03/13/2023    TSH 5.26 (H) 11/07/2023    INR 2.0 (H) 03/07/2023    HGBA1C 6.0 (H) 11/07/2023    HGBA1C 6.0 (H) 11/07/2023       XR knee left 4+ views    Result Date: 2/18/2024  Interpreted By:  Jazmyne Tavarez, STUDY: XR KNEE LEFT 4+ VIEWS;  2/18/2024 9:34  am   INDICATION: Signs/Symptoms:pain s/p injury 2 weeks .   COMPARISON: None.   ACCESSION NUMBER(S): [id number]   ORDERING CLINICIAN: EASTON DOAN   FINDINGS: Four views of the left knee obtained.   Osteopenia. No definite acute fracture or osseous displacement. Mild medial compartment narrowing. Patellar spurring. Small suprapatellar effusion and probable anterior/suprapatellar soft tissue swelling. Extensive presumed vascular calcifications in the posterior soft tissues.       No evidence of fracture. Joint effusion and probable anterior/suprapatellar soft tissue swelling. Mild productive/degenerative changes.   MACRO: None.   Signed by: Jazmyne Tavarez 2/18/2024 10:10 AM Dictation workstation:   [id number]    XR knee left 4+ views    Result Date: 2/18/2024  xr left knee 4 views Interpreted By:  Jazmyne Tavarez, STUDY: XR KNEE LEFT 4+ VIEWS;  2/18/2024 9:34 am    INDICATION: Signs/Symptoms:pain s/p injury 2 weeks .    COMPARISON: None.    ACCESSION NUMBER(S): [id number]    ORDERING CLINICIAN: EASTON DOAN    FINDINGS: Four views of the left knee obtained.    Osteopenia. No definite acute fracture or osseous displacement. Mild medial compartment narrowing. Patellar spurring. Small suprapatellar effusion and probable anterior/suprapatellar soft tissue swelling. Extensive presumed vascular calcifications in the posterior soft tissues.    IMPRESSION: No evidence of fracture. Joint effusion and probable anterior/suprapatellar soft tissue swelling. Mild productive/degenerative changes.    MACRO: None.    Signed by: Jazmyne Tavarez 2/18/2024 10:10 AM Dictation workstation:   [id number]                Assessment/Plan   Active Problems:  There are no active Hospital Problems.      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  She has a known subclavian vein occlusion.  The fistula is mostly all pulse.  She does get adequate dialysis.  The scab does not seem  to be getting any larger but is persistent.  She does have a little bit of pain around the scabbed area and there is some skin discoloration.  At this time, I would like to start her on Keflex 250 mg twice a day.  Also, I am concerned about the status of this scab.  I do feel that there is a risk of rupture.  I had a very long talk with her concerning the possibility of shutting down his fistula.  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  My recommendations at this time are to do an attempted resection of this scabbed aneurysmal portion of her fistula.  If I do not feel comfortable with the repair/revision, then I would also recommend to ligate the fistula and place a permacath right chest.  Risk and benefits have been explained to the patient.  She is in full agreement      Adis Alford, DO

## 2024-06-25 NOTE — PROGRESS NOTES
"Lyly Draper is a 79 y.o. female     Subjective   This patient presents today for follow-up of her left upper arm AV fistula.  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  She states that this was not from a needlestick.  I have been following her closely over the last couple of weeks.  She denies any fever or chills or nausea or vomiting.         Objective     Vitals:    06/25/24 1100   BP: 132/60   Pulse: 67   SpO2: 92%      Physical Exam  This patient is alert and oriented x 3.  She is in no acute distress.  Head is normocephalic.  Pupils are equal and reactive to light and accommodation.  Neck is soft and supple without palpable lymph nodes.  Heart is regular rate.  Lungs are relatively clear.  Abdomen is soft with positive bowel sounds there is no pain on palpation.  Her right upper extremity seems to have adequate range of motion.  Her left upper extremity has some decreased range of motion.  Her left upper extremity is much larger than her right.  She does have mostly pulse in her left arm AV fistula.  There is a dry scab with some skin discoloration involving one of her aneurysms of her fistula.  The scab does not look pulsatile.  Her lower extremities have decreased range of motion.  She does have palpable brachial radial and femoral pulses.  Skin turgor is adequate.  Psychologically she seems to be acting appropriately  Blood pressure 132/60, pulse 67, height 1.6 m (5' 3\"), weight 72.6 kg (160 lb), SpO2 92%.            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) 12/12/2023    Cervical radiculitis 12/12/2023    Chronic neck pain 12/12/2023    Diabetes mellitus (Multi) 12/12/2023    (HFpEF) heart failure with preserved ejection fraction (Multi) 12/12/2023    Diastolic congestive heart failure (Multi) 12/12/2023    " "Edema 12/12/2023    Elevated troponin 12/12/2023    Hemodialysis access, fistula mature (CMS-[facility]) 12/12/2023    Hemodialysis-associated hypotension 12/12/2023    History of thrombocytopenia 12/12/2023    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    Hypercholesterolemia 12/12/2023    Hyperphosphatemia 12/12/2023    Hypertension, benign 12/12/2023    Low vitamin D level 12/12/2023    Lumbar foraminal stenosis 12/12/2023    Lumbar neuritis 12/12/2023    Lumbar spondylosis 12/12/2023    Neuropathy 12/12/2023    Nondisplaced fracture of proximal end of right tibia 12/12/2023    Pancytopenia (Multi) 12/12/2023    Right knee pain 12/12/2023    Right median nerve neuropathy 12/12/2023    Spinal cord compression (Multi) 12/12/2023    Sprain of medial collateral ligament of right knee 12/12/2023    Subclavian vein obstruction (Multi) 12/12/2023    Subsequent non-ST elevation (NSTEMI) myocardial infarction (Multi) 12/12/2023    Venous hypertension 12/12/2023    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    End stage renal failure on dialysis (Multi) 07/19/2023    Functional diarrhea 07/19/2023    Acquired hypothyroidism 06/14/2023    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) 06/14/2023    Primary hypertension 06/14/2023    Diarrhea of presumed infectious origin 06/14/2023    Irritable bowel syndrome with diarrhea 06/14/2023          Current Outpatient Medications:     aspirin 81 mg EC tablet, Take 1 tablet (81 mg) by mouth once daily., Disp: , Rfl:     atorvastatin (Lipitor) 40 mg tablet, Take 1 tablet (40 mg) by mouth once daily at bedtime., Disp: , Rfl:     B complex-vitamin C-folic acid (Tre Caps) 1 mg capsule, once every 24 hours., Disp: , Rfl:     BD Insulin Syringe Ultra-Fine 0.5 mL 31 gauge x 5/16\" syringe, USE AS DIRECTED TWICE A DAY, Disp: , Rfl:     calcium carbonate (Tums Ultra) 400 mg calcium (1,000 mg) chewable tablet, Chew., Disp: , Rfl:     cephalexin (Keflex) 250 mg capsule, Take 1 " capsule (250 mg) by mouth 2 times a day for 7 days., Disp: 14 capsule, Rfl: 0    cholecalciferol (Vitamin D3) 50 MCG (2000 UT) tablet, BID, Disp: , Rfl:     cyanocobalamin (Vitamin B-12) 1,000 mcg tablet, Take 1 tablet (1,000 mcg) by mouth once daily., Disp: , Rfl:     ergocalciferol (Vitamin D-2) 1.25 MG (58279 UT) capsule, Take 1 capsule (1,250 mcg) by mouth 1 (one) time per week., Disp: , Rfl:     gabapentin (Neurontin) 100 mg capsule, TAKE 2 CAPSULE IN THE MORNING (AFTER DIALYSIS) AND TAKE 2 CAPSULES AT BEDTIME DAILY., Disp: , Rfl:     insulin NPH and regular human (HumuLIN 70-30, NovoLIN 70-30) 100 unit/mL (70-30) injection, Inject under the skin 2 times a day before meals. Take as directed per insulin instructions., Disp: , Rfl:     levothyroxine (Synthroid, Levoxyl) 100 mcg tablet, , Disp: , Rfl:     lisinopril 40 mg tablet, Take 1 tablet (40 mg) by mouth once daily., Disp: , Rfl:     loperamide (Imodium) 2 mg capsule, Take by mouth., Disp: , Rfl:     metoprolol tartrate (Lopressor) 25 mg tablet, Take 0.5 tablets (12.5 mg) by mouth once daily., Disp: , Rfl:     NovoLIN N NPH U-100 Insulin 100 unit/mL injection, 15 UNITS SUBCUTANEOUSLY ONCE DAILY 90 DAYS, Disp: , Rfl:     traMADol (Ultram) 50 mg tablet, Take 1 tablet (50 mg) by mouth every 6 hours if needed., Disp: , Rfl:      Lab Results   Component Value Date    WBC 6.0 03/13/2023    HGB 9.0 (L) 03/13/2023    HCT 28.0 (L) 03/13/2023     (L) 03/13/2023    CHOL 110 03/06/2023    TRIG 129 03/06/2023    HDL 30.7 (A) 03/06/2023    ALT 11 04/12/2022    AST 13 04/12/2022     (L) 03/13/2023    K 4.2 03/13/2023    CL 92 (L) 03/13/2023    CREATININE 6.22 (H) 03/13/2023    BUN 60 (H) 03/13/2023    CO2 29 03/13/2023    TSH 5.26 (H) 11/07/2023    INR 2.0 (H) 03/07/2023    HGBA1C 6.0 (H) 11/07/2023    HGBA1C 6.0 (H) 11/07/2023       XR knee left 4+ views    Result Date: 2/18/2024  Interpreted By:  Jazmyne Tavarez, STUDY: XR KNEE LEFT 4+ VIEWS;  2/18/2024 9:34  am   INDICATION: Signs/Symptoms:pain s/p injury 2 weeks .   COMPARISON: None.   ACCESSION NUMBER(S): [id number]   ORDERING CLINICIAN: EASTON DOAN   FINDINGS: Four views of the left knee obtained.   Osteopenia. No definite acute fracture or osseous displacement. Mild medial compartment narrowing. Patellar spurring. Small suprapatellar effusion and probable anterior/suprapatellar soft tissue swelling. Extensive presumed vascular calcifications in the posterior soft tissues.       No evidence of fracture. Joint effusion and probable anterior/suprapatellar soft tissue swelling. Mild productive/degenerative changes.   MACRO: None.   Signed by: Jazmyne Tavarez 2/18/2024 10:10 AM Dictation workstation:   [id number]    XR knee left 4+ views    Result Date: 2/18/2024  xr left knee 4 views Interpreted By:  Jazmyne Tavarez, STUDY: XR KNEE LEFT 4+ VIEWS;  2/18/2024 9:34 am    INDICATION: Signs/Symptoms:pain s/p injury 2 weeks .    COMPARISON: None.    ACCESSION NUMBER(S): [id number]    ORDERING CLINICIAN: EASTON DOAN    FINDINGS: Four views of the left knee obtained.    Osteopenia. No definite acute fracture or osseous displacement. Mild medial compartment narrowing. Patellar spurring. Small suprapatellar effusion and probable anterior/suprapatellar soft tissue swelling. Extensive presumed vascular calcifications in the posterior soft tissues.    IMPRESSION: No evidence of fracture. Joint effusion and probable anterior/suprapatellar soft tissue swelling. Mild productive/degenerative changes.    MACRO: None.    Signed by: Jazmyne Tavarez 2/18/2024 10:10 AM Dictation workstation:   [id number]                Assessment/Plan   Active Problems:  There are no active Hospital Problems.      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  She has a known subclavian vein occlusion.  The fistula is mostly all pulse.  She does get adequate dialysis.  The scab does not seem  to be getting any larger but is persistent.  She does have a little bit of pain around the scabbed area and there is some skin discoloration.  At this time, I would like to start her on Keflex 250 mg twice a day.  Also, I am concerned about the status of this scab.  I do feel that there is a risk of rupture.  I had a very long talk with her concerning the possibility of shutting down his fistula.  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  My recommendations at this time are to do an attempted resection of this scabbed aneurysmal portion of her fistula.  If I do not feel comfortable with the repair/revision, then I would also recommend to ligate the fistula and place a permacath right chest.  Risk and benefits have been explained to the patient.  She is in full agreement      Adis Alford, DO

## 2024-07-01 ENCOUNTER — PREP FOR PROCEDURE (OUTPATIENT)
Dept: VASCULAR SURGERY | Facility: HOSPITAL | Age: 79
End: 2024-07-01
Payer: COMMERCIAL

## 2024-07-01 DIAGNOSIS — N18.6 ESRD (END STAGE RENAL DISEASE) (MULTI): Primary | ICD-10-CM

## 2024-07-01 RX ORDER — CEFAZOLIN SODIUM 2 G/100ML
2 INJECTION, SOLUTION INTRAVENOUS ONCE
Status: CANCELLED | OUTPATIENT
Start: 2024-07-02

## 2024-07-01 RX ORDER — SODIUM CHLORIDE 9 MG/ML
100 INJECTION, SOLUTION INTRAVENOUS CONTINUOUS
Status: CANCELLED | OUTPATIENT
Start: 2024-07-02

## 2024-07-01 NOTE — PREPROCEDURE INSTRUCTIONS
No outpatient medications have been marked as taking for the 7/2/24 encounter (Hospital Encounter).     Unable to review pt medications-spoke with daughter Beatriz who was driving and uncertain of the details of pt's medications. Instructed to take metoprolol & Levothyroxine in am and 1/2 dose of night time insulin tonight.       NPO Instructions:  Light breakfast before 6:30 (per office)      Additional Instructions:     Enter through main entrance of [facility], located at [address] [facility]. Proceed to registration, located in the back right hand corner. You will need your ID and insurance card for registration. Please ensure you have a responsible adult to drive you home.     Take a shower the morning of or night before your procedure. After you shower avoid lotions, powders, deodorants or anything applied to the skin. If you wear contacts or glasses, wear the glasses. If you do not have glasses, please bring a case for your contacts. You may wear hearing aids and dentures, bring a case for them or we will provide one. Make sure you wear something loose and comfortable. Keep in mind your surgery type and wear something that will accommodate incisions or bandages. Please remove all jewelry.     You may have up to 13.5 ounces of clear liquids 2 hours before* your instructed ARRIVAL time (11:30)* to the hospital. You may take medications discussed during phone call with a small sip of water.    For further questions Jody BOLES can be contacted at [phone] between 7AM-3PM.

## 2024-07-01 NOTE — PROGRESS NOTES
"Lyly Draper is a 79 y.o. female     Subjective   This patient presents today for evaluation of her left arm AV fistula.  She now has a dry scab over one of the aneurysms in the mid brachial region of her fistula.  She states that this area was not cannulated.         Objective     Vitals:    06/18/24 1300   BP: 136/72   Pulse: 76   SpO2: 98%      Physical Exam  This patient is alert and oriented x 3.  She is in no acute distress.  Head is normocephalic.  Pupils are equal and reactive to light accommodation.  Neck is soft and supple without palpable lymph nodes.  Heart is regular rate.  Lungs are clear.  Abdomen is soft with positive bowel sounds there is no pain on palpation.  Right upper extremity seems to have adequate range of motion.  Her left upper extremity has some decreased range of motion.  Her lower extremities have decreased range of motion.  She has mostly pulse in her left brachiobasilic fistula.  Psychologically she appears to be acting appropriately  Blood pressure 136/72, pulse 76, height 1.6 m (5' 3\"), weight 72.6 kg (160 lb), SpO2 98%.            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) 12/12/2023    Cervical radiculitis 12/12/2023    Chronic neck pain 12/12/2023    Diabetes mellitus (Multi) 12/12/2023    (HFpEF) heart failure with preserved ejection fraction (Multi) 12/12/2023    Diastolic congestive heart failure (Multi) 12/12/2023    Edema 12/12/2023    Elevated troponin 12/12/2023    Hemodialysis access, fistula mature (CMS-[facility]) 12/12/2023    Hemodialysis-associated hypotension 12/12/2023    History of thrombocytopenia 12/12/2023    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    Hypercholesterolemia 12/12/2023    " "Hyperphosphatemia 12/12/2023    Hypertension, benign 12/12/2023    Low vitamin D level 12/12/2023    Lumbar foraminal stenosis 12/12/2023    Lumbar neuritis 12/12/2023    Lumbar spondylosis 12/12/2023    Neuropathy 12/12/2023    Nondisplaced fracture of proximal end of right tibia 12/12/2023    Pancytopenia (Multi) 12/12/2023    Right knee pain 12/12/2023    Right median nerve neuropathy 12/12/2023    Spinal cord compression (Multi) 12/12/2023    Sprain of medial collateral ligament of right knee 12/12/2023    Subclavian vein obstruction (Multi) 12/12/2023    Subsequent non-ST elevation (NSTEMI) myocardial infarction (Multi) 12/12/2023    Venous hypertension 12/12/2023    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    End stage renal failure on dialysis (Multi) 07/19/2023    Functional diarrhea 07/19/2023    Acquired hypothyroidism 06/14/2023    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) 06/14/2023    Primary hypertension 06/14/2023    Diarrhea of presumed infectious origin 06/14/2023    Irritable bowel syndrome with diarrhea 06/14/2023          Current Outpatient Medications:     aspirin 81 mg EC tablet, Take 1 tablet (81 mg) by mouth once daily., Disp: , Rfl:     atorvastatin (Lipitor) 40 mg tablet, Take 1 tablet (40 mg) by mouth once daily at bedtime., Disp: , Rfl:     B complex-vitamin C-folic acid (Tre Caps) 1 mg capsule, once every 24 hours., Disp: , Rfl:     BD Insulin Syringe Ultra-Fine 0.5 mL 31 gauge x 5/16\" syringe, USE AS DIRECTED TWICE A DAY, Disp: , Rfl:     calcium carbonate (Tums Ultra) 400 mg calcium (1,000 mg) chewable tablet, Chew., Disp: , Rfl:     cephalexin (Keflex) 250 mg capsule, Take 1 capsule (250 mg) by mouth 2 times a day for 7 days., Disp: 14 capsule, Rfl: 0    cholecalciferol (Vitamin D3) 50 MCG (2000 UT) tablet, BID, Disp: , Rfl:     cyanocobalamin (Vitamin B-12) 1,000 mcg tablet, Take 1 tablet (1,000 mcg) by mouth once daily., Disp: , Rfl:     " ergocalciferol (Vitamin D-2) 1.25 MG (62328 UT) capsule, Take 1 capsule (1,250 mcg) by mouth 1 (one) time per week., Disp: , Rfl:     gabapentin (Neurontin) 100 mg capsule, TAKE 2 CAPSULE IN THE MORNING (AFTER DIALYSIS) AND TAKE 2 CAPSULES AT BEDTIME DAILY., Disp: , Rfl:     insulin NPH and regular human (HumuLIN 70-30, NovoLIN 70-30) 100 unit/mL (70-30) injection, Inject under the skin 2 times a day before meals. Take as directed per insulin instructions., Disp: , Rfl:     levothyroxine (Synthroid, Levoxyl) 100 mcg tablet, , Disp: , Rfl:     lisinopril 40 mg tablet, Take 1 tablet (40 mg) by mouth once daily., Disp: , Rfl:     loperamide (Imodium) 2 mg capsule, Take by mouth., Disp: , Rfl:     metoprolol tartrate (Lopressor) 25 mg tablet, Take 0.5 tablets (12.5 mg) by mouth once daily., Disp: , Rfl:     NovoLIN N NPH U-100 Insulin 100 unit/mL injection, 15 UNITS SUBCUTANEOUSLY ONCE DAILY 90 DAYS, Disp: , Rfl:     traMADol (Ultram) 50 mg tablet, Take 1 tablet (50 mg) by mouth every 6 hours if needed., Disp: , Rfl:      Lab Results   Component Value Date    WBC 6.0 03/13/2023    HGB 9.0 (L) 03/13/2023    HCT 28.0 (L) 03/13/2023     (L) 03/13/2023    CHOL 110 03/06/2023    TRIG 129 03/06/2023    HDL 30.7 (A) 03/06/2023    ALT 11 04/12/2022    AST 13 04/12/2022     (L) 03/13/2023    K 4.2 03/13/2023    CL 92 (L) 03/13/2023    CREATININE 6.22 (H) 03/13/2023    BUN 60 (H) 03/13/2023    CO2 29 03/13/2023    TSH 5.26 (H) 11/07/2023    INR 2.0 (H) 03/07/2023    HGBA1C 6.0 (H) 11/07/2023    HGBA1C 6.0 (H) 11/07/2023       XR knee left 4+ views    Result Date: 2/18/2024  Interpreted By:  Jazymne Tavarez, STUDY: XR KNEE LEFT 4+ VIEWS;  2/18/2024 9:34 am   INDICATION: Signs/Symptoms:pain s/p injury 2 weeks .   COMPARISON: None.   ACCESSION NUMBER(S): [id number]   ORDERING CLINICIAN: EASTON DOAN   FINDINGS: Four views of the left knee obtained.   Osteopenia. No definite acute fracture or osseous displacement.  Mild medial compartment narrowing. Patellar spurring. Small suprapatellar effusion and probable anterior/suprapatellar soft tissue swelling. Extensive presumed vascular calcifications in the posterior soft tissues.       No evidence of fracture. Joint effusion and probable anterior/suprapatellar soft tissue swelling. Mild productive/degenerative changes.   MACRO: None.   Signed by: Jazmyne Tavarez 2/18/2024 10:10 AM Dictation workstation:   [id number]    XR knee left 4+ views    Result Date: 2/18/2024  xr left knee 4 views Interpreted By:  Jazmyne Tavarez, STUDY: XR KNEE LEFT 4+ VIEWS;  2/18/2024 9:34 am    INDICATION: Signs/Symptoms:pain s/p injury 2 weeks .    COMPARISON: None.    ACCESSION NUMBER(S): [id number]    ORDERING CLINICIAN: EASTON DOAN    FINDINGS: Four views of the left knee obtained.    Osteopenia. No definite acute fracture or osseous displacement. Mild medial compartment narrowing. Patellar spurring. Small suprapatellar effusion and probable anterior/suprapatellar soft tissue swelling. Extensive presumed vascular calcifications in the posterior soft tissues.    IMPRESSION: No evidence of fracture. Joint effusion and probable anterior/suprapatellar soft tissue swelling. Mild productive/degenerative changes.    MACRO: None.    Signed by: Jazmyne Tavarez 2/18/2024 10:10 AM Dictation workstation:   [id number]                Assessment/Plan   Active Problems:  There are no active Hospital Problems.      This patient presents today for evaluation of her left brachial basilic fistula.  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  The scab seems relatively superficial at this time.  I am requesting that she dabs the area with Betadine twice a day.  I will need her to follow-up within a week for reevaluation of this issue.  This has the potential to become a significant issue.      Adis Alford,  DO

## 2024-07-02 ENCOUNTER — ANESTHESIA EVENT (OUTPATIENT)
Dept: OPERATING ROOM | Facility: HOSPITAL | Age: 79
End: 2024-07-02
Payer: COMMERCIAL

## 2024-07-02 ENCOUNTER — ANESTHESIA (OUTPATIENT)
Dept: OPERATING ROOM | Facility: HOSPITAL | Age: 79
End: 2024-07-02
Payer: COMMERCIAL

## 2024-07-02 ENCOUNTER — HOSPITAL ENCOUNTER (OUTPATIENT)
Facility: HOSPITAL | Age: 79
Setting detail: OUTPATIENT SURGERY
Discharge: HOME | End: 2024-07-02
Attending: SURGERY | Admitting: SURGERY
Payer: COMMERCIAL

## 2024-07-02 VITALS
TEMPERATURE: 99 F | OXYGEN SATURATION: 98 % | HEART RATE: 53 BPM | DIASTOLIC BLOOD PRESSURE: 51 MMHG | SYSTOLIC BLOOD PRESSURE: 118 MMHG | WEIGHT: 159.83 LBS | HEIGHT: 63 IN | RESPIRATION RATE: 16 BRPM | BODY MASS INDEX: 28.32 KG/M2

## 2024-07-02 DIAGNOSIS — N18.6 ESRD (END STAGE RENAL DISEASE) (MULTI): Primary | ICD-10-CM

## 2024-07-02 LAB
ANION GAP SERPL CALC-SCNC: 18 MMOL/L (ref 10–20)
BUN SERPL-MCNC: 53 MG/DL (ref 6–23)
CALCIUM SERPL-MCNC: 9.7 MG/DL (ref 8.6–10.3)
CHLORIDE SERPL-SCNC: 93 MMOL/L (ref 98–107)
CO2 SERPL-SCNC: 25 MMOL/L (ref 21–32)
CREAT SERPL-MCNC: 5.59 MG/DL (ref 0.5–1.05)
EGFRCR SERPLBLD CKD-EPI 2021: 7 ML/MIN/1.73M*2
ERYTHROCYTE [DISTWIDTH] IN BLOOD BY AUTOMATED COUNT: 15.5 % (ref 11.5–14.5)
GLUCOSE BLD MANUAL STRIP-MCNC: 161 MG/DL (ref 74–99)
GLUCOSE SERPL-MCNC: 153 MG/DL (ref 74–99)
HCT VFR BLD AUTO: 35 % (ref 36–46)
HGB BLD-MCNC: 12 G/DL (ref 12–16)
MCH RBC QN AUTO: 34.4 PG (ref 26–34)
MCHC RBC AUTO-ENTMCNC: 34.3 G/DL (ref 32–36)
MCV RBC AUTO: 100 FL (ref 80–100)
NRBC BLD-RTO: 0 /100 WBCS (ref 0–0)
PLATELET # BLD AUTO: 112 X10*3/UL (ref 150–450)
POTASSIUM SERPL-SCNC: 5 MMOL/L (ref 3.5–5.3)
RBC # BLD AUTO: 3.49 X10*6/UL (ref 4–5.2)
SODIUM SERPL-SCNC: 131 MMOL/L (ref 136–145)
WBC # BLD AUTO: 6.2 X10*3/UL (ref 4.4–11.3)

## 2024-07-02 PROCEDURE — 36832 AV FISTULA REVISION OPEN: CPT | Performed by: SURGERY

## 2024-07-02 PROCEDURE — 3700000002 HC GENERAL ANESTHESIA TIME - EACH INCREMENTAL 1 MINUTE: Performed by: SURGERY

## 2024-07-02 PROCEDURE — 7100000010 HC PHASE TWO TIME - EACH INCREMENTAL 1 MINUTE: Performed by: SURGERY

## 2024-07-02 PROCEDURE — 3600000003 HC OR TIME - INITIAL BASE CHARGE - PROCEDURE LEVEL THREE: Performed by: SURGERY

## 2024-07-02 PROCEDURE — 2500000005 HC RX 250 GENERAL PHARMACY W/O HCPCS: Performed by: SURGERY

## 2024-07-02 PROCEDURE — 2720000007 HC OR 272 NO HCPCS: Performed by: SURGERY

## 2024-07-02 PROCEDURE — 2500000005 HC RX 250 GENERAL PHARMACY W/O HCPCS: Performed by: ANESTHESIOLOGIST ASSISTANT

## 2024-07-02 PROCEDURE — 85027 COMPLETE CBC AUTOMATED: CPT | Performed by: SURGERY

## 2024-07-02 PROCEDURE — 36415 COLL VENOUS BLD VENIPUNCTURE: CPT | Performed by: SURGERY

## 2024-07-02 PROCEDURE — 3600000008 HC OR TIME - EACH INCREMENTAL 1 MINUTE - PROCEDURE LEVEL THREE: Performed by: SURGERY

## 2024-07-02 PROCEDURE — 82947 ASSAY GLUCOSE BLOOD QUANT: CPT | Mod: 59

## 2024-07-02 PROCEDURE — 87205 SMEAR GRAM STAIN: CPT | Mod: PARLAB | Performed by: SURGERY

## 2024-07-02 PROCEDURE — 2500000004 HC RX 250 GENERAL PHARMACY W/ HCPCS (ALT 636 FOR OP/ED): Performed by: ANESTHESIOLOGIST ASSISTANT

## 2024-07-02 PROCEDURE — 3700000001 HC GENERAL ANESTHESIA TIME - INITIAL BASE CHARGE: Performed by: SURGERY

## 2024-07-02 PROCEDURE — 7100000001 HC RECOVERY ROOM TIME - INITIAL BASE CHARGE: Performed by: SURGERY

## 2024-07-02 PROCEDURE — 7100000009 HC PHASE TWO TIME - INITIAL BASE CHARGE: Performed by: SURGERY

## 2024-07-02 PROCEDURE — 82374 ASSAY BLOOD CARBON DIOXIDE: CPT | Performed by: SURGERY

## 2024-07-02 PROCEDURE — 7100000002 HC RECOVERY ROOM TIME - EACH INCREMENTAL 1 MINUTE: Performed by: SURGERY

## 2024-07-02 PROCEDURE — 2500000004 HC RX 250 GENERAL PHARMACY W/ HCPCS (ALT 636 FOR OP/ED): Performed by: SURGERY

## 2024-07-02 RX ORDER — PHENYLEPHRINE HCL IN 0.9% NACL 1 MG/10 ML
SYRINGE (ML) INTRAVENOUS AS NEEDED
Status: DISCONTINUED | OUTPATIENT
Start: 2024-07-02 | End: 2024-07-02

## 2024-07-02 RX ORDER — PROPOFOL 10 MG/ML
INJECTION, EMULSION INTRAVENOUS AS NEEDED
Status: DISCONTINUED | OUTPATIENT
Start: 2024-07-02 | End: 2024-07-02

## 2024-07-02 RX ORDER — ONDANSETRON HYDROCHLORIDE 2 MG/ML
4 INJECTION, SOLUTION INTRAVENOUS ONCE AS NEEDED
Status: DISCONTINUED | OUTPATIENT
Start: 2024-07-02 | End: 2024-07-02 | Stop reason: HOSPADM

## 2024-07-02 RX ORDER — MIDAZOLAM HYDROCHLORIDE 1 MG/ML
1 INJECTION, SOLUTION INTRAMUSCULAR; INTRAVENOUS ONCE AS NEEDED
Status: DISCONTINUED | OUTPATIENT
Start: 2024-07-02 | End: 2024-07-02 | Stop reason: HOSPADM

## 2024-07-02 RX ORDER — LIDOCAINE HCL/PF 100 MG/5ML
SYRINGE (ML) INTRAVENOUS AS NEEDED
Status: DISCONTINUED | OUTPATIENT
Start: 2024-07-02 | End: 2024-07-02

## 2024-07-02 RX ORDER — SODIUM CHLORIDE 9 MG/ML
100 INJECTION, SOLUTION INTRAVENOUS CONTINUOUS
Status: DISCONTINUED | OUTPATIENT
Start: 2024-07-02 | End: 2024-07-02 | Stop reason: HOSPADM

## 2024-07-02 RX ORDER — GLYCOPYRROLATE 0.2 MG/ML
INJECTION INTRAMUSCULAR; INTRAVENOUS AS NEEDED
Status: DISCONTINUED | OUTPATIENT
Start: 2024-07-02 | End: 2024-07-02

## 2024-07-02 RX ORDER — ONDANSETRON HYDROCHLORIDE 2 MG/ML
INJECTION, SOLUTION INTRAVENOUS AS NEEDED
Status: DISCONTINUED | OUTPATIENT
Start: 2024-07-02 | End: 2024-07-02

## 2024-07-02 RX ORDER — NORETHINDRONE AND ETHINYL ESTRADIOL 0.5-0.035
KIT ORAL AS NEEDED
Status: DISCONTINUED | OUTPATIENT
Start: 2024-07-02 | End: 2024-07-02

## 2024-07-02 RX ORDER — MEPERIDINE HYDROCHLORIDE 25 MG/ML
12.5 INJECTION INTRAMUSCULAR; INTRAVENOUS; SUBCUTANEOUS EVERY 10 MIN PRN
Status: DISCONTINUED | OUTPATIENT
Start: 2024-07-02 | End: 2024-07-02 | Stop reason: HOSPADM

## 2024-07-02 RX ORDER — HYDROMORPHONE HYDROCHLORIDE 1 MG/ML
1 INJECTION, SOLUTION INTRAMUSCULAR; INTRAVENOUS; SUBCUTANEOUS EVERY 5 MIN PRN
Status: DISCONTINUED | OUTPATIENT
Start: 2024-07-02 | End: 2024-07-02 | Stop reason: HOSPADM

## 2024-07-02 RX ORDER — HYDRALAZINE HYDROCHLORIDE 20 MG/ML
5 INJECTION INTRAMUSCULAR; INTRAVENOUS EVERY 30 MIN PRN
Status: DISCONTINUED | OUTPATIENT
Start: 2024-07-02 | End: 2024-07-02 | Stop reason: HOSPADM

## 2024-07-02 RX ORDER — SODIUM CHLORIDE 0.9 % (FLUSH) 0.9 %
SYRINGE (ML) INJECTION AS NEEDED
Status: DISCONTINUED | OUTPATIENT
Start: 2024-07-02 | End: 2024-07-02 | Stop reason: HOSPADM

## 2024-07-02 RX ORDER — DIPHENHYDRAMINE HYDROCHLORIDE 50 MG/ML
12.5 INJECTION INTRAMUSCULAR; INTRAVENOUS ONCE AS NEEDED
Status: DISCONTINUED | OUTPATIENT
Start: 2024-07-02 | End: 2024-07-02 | Stop reason: HOSPADM

## 2024-07-02 RX ORDER — ACETAMINOPHEN 325 MG/1
650 TABLET ORAL EVERY 4 HOURS PRN
Status: DISCONTINUED | OUTPATIENT
Start: 2024-07-02 | End: 2024-07-02 | Stop reason: HOSPADM

## 2024-07-02 RX ORDER — LABETALOL HYDROCHLORIDE 5 MG/ML
5 INJECTION, SOLUTION INTRAVENOUS ONCE AS NEEDED
Status: DISCONTINUED | OUTPATIENT
Start: 2024-07-02 | End: 2024-07-02 | Stop reason: HOSPADM

## 2024-07-02 RX ORDER — SODIUM CHLORIDE, SODIUM LACTATE, POTASSIUM CHLORIDE, CALCIUM CHLORIDE 600; 310; 30; 20 MG/100ML; MG/100ML; MG/100ML; MG/100ML
100 INJECTION, SOLUTION INTRAVENOUS CONTINUOUS
Status: DISCONTINUED | OUTPATIENT
Start: 2024-07-02 | End: 2024-07-02 | Stop reason: HOSPADM

## 2024-07-02 RX ORDER — FENTANYL CITRATE 50 UG/ML
INJECTION, SOLUTION INTRAMUSCULAR; INTRAVENOUS AS NEEDED
Status: DISCONTINUED | OUTPATIENT
Start: 2024-07-02 | End: 2024-07-02

## 2024-07-02 RX ORDER — ATROPINE SULFATE 0.4 MG/ML
INJECTION, SOLUTION ENDOTRACHEAL; INTRAMEDULLARY; INTRAMUSCULAR; INTRAVENOUS; SUBCUTANEOUS AS NEEDED
Status: DISCONTINUED | OUTPATIENT
Start: 2024-07-02 | End: 2024-07-02

## 2024-07-02 RX ORDER — CEFAZOLIN SODIUM 2 G/100ML
2 INJECTION, SOLUTION INTRAVENOUS ONCE
Status: DISCONTINUED | OUTPATIENT
Start: 2024-07-02 | End: 2024-07-02 | Stop reason: HOSPADM

## 2024-07-02 RX ORDER — CEFAZOLIN 1 G/1
INJECTION, POWDER, FOR SOLUTION INTRAVENOUS AS NEEDED
Status: DISCONTINUED | OUTPATIENT
Start: 2024-07-02 | End: 2024-07-02

## 2024-07-02 RX ORDER — HEPARIN SODIUM 1000 [USP'U]/ML
INJECTION, SOLUTION INTRAVENOUS; SUBCUTANEOUS AS NEEDED
Status: DISCONTINUED | OUTPATIENT
Start: 2024-07-02 | End: 2024-07-02

## 2024-07-02 SDOH — HEALTH STABILITY: MENTAL HEALTH: CURRENT SMOKER: 0

## 2024-07-02 ASSESSMENT — PAIN SCALES - GENERAL
PAINLEVEL_OUTOF10: 0 - NO PAIN
PAIN_LEVEL: 0
PAINLEVEL_OUTOF10: 0 - NO PAIN

## 2024-07-02 ASSESSMENT — PAIN - FUNCTIONAL ASSESSMENT
PAIN_FUNCTIONAL_ASSESSMENT: 0-10

## 2024-07-02 ASSESSMENT — COLUMBIA-SUICIDE SEVERITY RATING SCALE - C-SSRS
1. IN THE PAST MONTH, HAVE YOU WISHED YOU WERE DEAD OR WISHED YOU COULD GO TO SLEEP AND NOT WAKE UP?: NO
2. HAVE YOU ACTUALLY HAD ANY THOUGHTS OF KILLING YOURSELF?: NO
6. HAVE YOU EVER DONE ANYTHING, STARTED TO DO ANYTHING, OR PREPARED TO DO ANYTHING TO END YOUR LIFE?: NO

## 2024-07-02 NOTE — ANESTHESIA PROCEDURE NOTES
Airway  Date/Time: 7/2/2024 1:27 PM  Urgency: elective      Staffing  Performed: CAA   Authorized by: Azael Christian MD    Performed by: ARIN Manning  Patient location during procedure: OR    Indications and Patient Condition  Indications for airway management: anesthesia  Spontaneous Ventilation: absent  Sedation level: deep  Preoxygenated: yes  Mask difficulty assessment: 1 - vent by mask    Final Airway Details  Final airway type: supraglottic airway      Successful airway: classic  Size 3     Number of attempts at approach: 1

## 2024-07-02 NOTE — ANESTHESIA PREPROCEDURE EVALUATION
Patient: Lyly Draper    Procedure Information       Date/Time: 07/02/24 1300    Procedures:       LEFT ARM AV FISTUA REVISION (Left) - Revision of left arm AV fistula.  Possible permacath right chest      POSSIBLE PERMACATH PLACEMENT RIGHT CHEST WITH C-ARM    Location: [facility] 12 / [facility]    Surgeons: Adis Alford, DO            Relevant Problems   Anesthesia (within normal limits)      Cardiac   (+) Aortic stenosis   (+) Diastolic congestive heart failure (Multi)   (+) Endogenous hyperlipidemia   (+) Hypercholesterolemia   (+) Hypertension, benign   (+) Primary hypertension   (+) Subsequent non-ST elevation (NSTEMI) myocardial infarction (Multi)      Neuro   (+) Cervical radiculitis   (+) Lumbar neuritis   (+) Right median nerve neuropathy      GI   (+) Irritable bowel syndrome with diarrhea      /Renal   (+) ESRD (end stage renal disease) (Multi)   (+) End stage renal failure on dialysis (Multi)      Endocrine   (+) Acquired hypothyroidism   (+) 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)      Hematology   (+) Pancytopenia (Multi)      Musculoskeletal   (+) Chronic neck pain   (+) Lumbar foraminal stenosis   (+) Lumbar spondylosis   (+) 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)       Clinical information reviewed:   Tobacco  Allergies  Meds   Med Hx  Surg Hx   Fam Hx  Soc Hx        NPO Detail:  NPO/Void Status  Date of Last Liquid: 07/02/24  Time of Last Liquid: 0600  Date of Last Solid: 07/01/24  Time of Last Solid: 2300         Physical Exam    Airway  Mallampati: II  TM distance: >3 FB  Neck ROM: full     Cardiovascular - normal exam  Rhythm: regular  Rate: normal     Dental - normal exam     Pulmonary - normal exam     Abdominal            Anesthesia Plan    History of general anesthesia?: yes  History of complications of general anesthesia?: no    ASA 3     general     The patient is not a current smoker.    intravenous induction   Postoperative  administration of opioids is intended.  Trial extubation is planned.  Anesthetic plan and risks discussed with patient.    Plan discussed with CAA.

## 2024-07-02 NOTE — ANESTHESIA POSTPROCEDURE EVALUATION
Patient: Lyly Draper    Procedure Summary       Date: 07/02/24 Room / Location: [facility] 12 / [facility]    Anesthesia Start: 1315 Anesthesia Stop: 1519    Procedure: LEFT ARM AV FISTUA REVISION (Left) Diagnosis:       ESRD (end stage renal disease) (Multi)      (ESRD (end stage renal disease) (Multi) [N18.6])    Surgeons: Adis Alford DO Responsible Provider: Azael Christian MD    Anesthesia Type: general ASA Status: 3            Anesthesia Type: general    Vitals Value Taken Time   /64 07/02/24 1515   Temp 36.4 07/02/24 1519   Pulse 50 07/02/24 1518   Resp 16 07/02/24 1519   SpO2 100 % 07/02/24 1518   Vitals shown include unfiled device data.    Anesthesia Post Evaluation    Patient location during evaluation: PACU  Patient participation: waiting for patient participation  Level of consciousness: sleepy but conscious  Pain score: 0  Pain management: adequate  Airway patency: patent  Cardiovascular status: hemodynamically stable and bradycardic  Respiratory status: acceptable and face mask  Hydration status: acceptable  Postoperative Nausea and Vomiting: none      There were no known notable events for this encounter.

## 2024-07-03 NOTE — OP NOTE
LEFT ARM AV FISTUA REVISION (L) Operative Note     Date: 2024  OR Location: [facility]    Name: Lyly Draper, : 1945, Age: 79 y.o., MRN: [id number], Sex: female    Diagnosis  Pre-op Diagnosis     * ESRD (end stage renal disease) (Multi) [N18.6] Post-op Diagnosis     * ESRD (end stage renal disease) (Multi) [N18.6]     Procedures  LEFT ARM AV FISTUA REVISION  60474 - IA REVJ OPN ARVEN FSTL W/O THRMBC DIAL GRF    IA INSJ TUNNELED CVC W/O SUBQ PORT/ AGE 5 YR/> [09338]  Surgeons      * Adis Alford - Primary    Resident/Fellow/Other Assistant:  Surgeons and Role:  * No surgeons found with a matching role *    Procedure Summary  Anesthesia: General  ASA: III  Anesthesia Staff: Anesthesiologist: MD PASQUALE Wallace-AA: ARIN Manning  Estimated Blood Loss: 75mL  Intra-op Medications:   Administrations occurring from 1300 to 1500 on 24:   Medication Name Total Dose   thrombin solution 5,000 Units   sodium chloride 0.9% flush 500 mL              Anesthesia Record               Intraprocedure I/O Totals          Intake    sodium chloride 0.9% infusion 400.00 mL    Total Intake 400 mL          Specimen:   ID Type Source Tests Collected by Time   A : TISSUE CULTURE FROM LEFT ARM AV FISTULA Tissue AV MALFORMATION TISSUE/WOUND CULTURE/SMEAR Adis Alford, DO 2024 1358        Staff:   Circulator: Alda Parikhub Person: Linda Parikhub Person: Lydia Astorga Circulator: Farrah         Drains and/or Catheters: * None in log *    Tourniquet Times:     Total Tourniquet Time Documented:  Arm - Upper (Left) - 10 minutes  Arm - Upper (Left) - 5 minutes  Arm - Upper (Left) - 9 minutes  Total: Arm - Upper (Left) - 24 minutes      Implants:     Findings:     Indications: Lyly Draper is an 79 y.o. female who is having surgery for ESRD (end stage renal disease) (Multi) [N18.6].     The patient was seen in the preoperative area. The risks, benefits, complications, treatment options, non-operative alternatives,  expected recovery and outcomes were discussed with the patient. The possibilities of reaction to medication, pulmonary aspiration, injury to surrounding structures, bleeding, recurrent infection, the need for additional procedures, failure to diagnose a condition, and creating a complication requiring transfusion or operation were discussed with the patient. The patient concurred with the proposed plan, giving informed consent.  The site of surgery was properly noted/marked if necessary per policy. The patient has been actively warmed in preoperative area. Preoperative antibiotics have been ordered and given within 1 hours of incision. Venous thrombosis prophylaxis have been ordered including bilateral sequential compression devices    Procedure Details: After the procedure was fully explained to the patient and after adequate consent form was patient had an IV and received preoperative IV antibiotics.  Patient was then taken operating room placed in the supine position and given general anesthetic agent had LMA for ventilation.  The entire left chest and left upper extremity was cleaned and prepped in usual sterile fashion.  A sterile tourniquet was placed in the very proximal portion of the left upper extremity.  The patient was given 2000 units of heparin.  The tourniquet was inflated to 205 mm of pressure.  Using a scalpel, a circumferential incision was made around the scab on the apex of the mid humeral fistula aneurysm.  Dissection was carried down into the fistula using a scalpel circumferentially.  The necrotic tissue and a necrotic portion of the vein was then removed.  Some of this was sent for culture and sensitivity.  There was a small amount of old thrombus identified within the fistula that was removed.  The remainder of the vein wall was found to be normal and healthy.  A portion of the vein was then  from the subcutaneous tissue and the skin.  The vein was then closed using a running suture  of 5-0 Prolene.  The tourniquet was then let down.  There were 2 areas of bleeding identified along the suture line.  This was repaired using interrupted sutures of 5-0 Prolene.  There was also bleeding along the skin edge that was controlled using cautery.  At this time, the wound was copiously irrigated with antibiotic solution.  The skin was then closed using a running subcuticular stitch of 5-0 Monocryl.  Medical grade surgical glue was then used to seal the incision.  Telfa Tegaderm was applied after the glue dried.  The fistula did have both thrill and pulse as before.  The patient tolerated the procedure well.  No complications noted.  Instrument count needle count sponge counts correct.  Thank you very much Discenza dictation  Complications:  None; patient tolerated the procedure well.    Disposition: PACU - hemodynamically stable.  Condition: stable         Additional Details:     Attending Attestation: I was present and scrubbed for the entire procedure.    Adis Alford  Phone Number: [phone]

## 2024-07-04 LAB
BACTERIA SPEC CULT: NORMAL
GRAM STN SPEC: NORMAL
GRAM STN SPEC: NORMAL

## 2024-07-09 ENCOUNTER — OFFICE VISIT (OUTPATIENT)
Dept: VASCULAR SURGERY | Facility: CLINIC | Age: 79
End: 2024-07-09
Payer: COMMERCIAL

## 2024-07-09 ENCOUNTER — APPOINTMENT (OUTPATIENT)
Dept: VASCULAR SURGERY | Facility: CLINIC | Age: 79
End: 2024-07-09
Payer: COMMERCIAL

## 2024-07-09 VITALS
WEIGHT: 159 LBS | HEIGHT: 63 IN | BODY MASS INDEX: 28.17 KG/M2 | HEART RATE: 72 BPM | SYSTOLIC BLOOD PRESSURE: 128 MMHG | DIASTOLIC BLOOD PRESSURE: 56 MMHG

## 2024-07-09 DIAGNOSIS — T82.858D ARTERIOVENOUS FISTULA STENOSIS, SUBSEQUENT ENCOUNTER: Primary | ICD-10-CM

## 2024-07-09 PROCEDURE — 1160F RVW MEDS BY RX/DR IN RCRD: CPT | Performed by: SURGERY

## 2024-07-09 PROCEDURE — 99024 POSTOP FOLLOW-UP VISIT: CPT | Performed by: SURGERY

## 2024-07-09 PROCEDURE — 3078F DIAST BP <80 MM HG: CPT | Performed by: SURGERY

## 2024-07-09 PROCEDURE — 3074F SYST BP LT 130 MM HG: CPT | Performed by: SURGERY

## 2024-07-09 PROCEDURE — 1159F MED LIST DOCD IN RCRD: CPT | Performed by: SURGERY

## 2024-07-14 NOTE — PROGRESS NOTES
"Lyly Draper is a 79 y.o. female     Subjective   This patient presents today for follow-up of her revision of her left arm AV fistula.  She has been ulcer over her mid upper arm over an aneurysm.  I did a revision of this area.  She currently denies any fever chills nausea vomiting or headache.  She denies any bleeding from this area         Objective     Vitals:    07/09/24 1154   BP: 128/56   Pulse: 72      Physical Exam  This patient is alert and oriented x 3.  She is in no acute distress.  Her head is normocephalic.  Pupils are equal and reactive to light and accommodation.  Her left upper arm AV fistula does have both a thrill and pulse.  The area of revision appears to be intact.  The incision appears to be stable.  The discoloration is significantly improved than prior to surgery  Blood pressure 128/56, pulse 72, height 1.6 m (5' 3\"), weight 72.1 kg (159 lb).            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) 12/12/2023    Cervical radiculitis 12/12/2023    Chronic neck pain 12/12/2023    Diabetes mellitus (Multi) 12/12/2023    (HFpEF) heart failure with preserved ejection fraction (Multi) 12/12/2023    Diastolic congestive heart failure (Multi) 12/12/2023    Edema 12/12/2023    Elevated troponin 12/12/2023    Hemodialysis access, fistula mature (CMS-formerly [facility]) 12/12/2023    Hemodialysis-associated hypotension 12/12/2023    History of thrombocytopenia 12/12/2023    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    Hypercholesterolemia 12/12/2023    Hyperphosphatemia 12/12/2023    Hypertension, benign 12/12/2023    Low vitamin D level 12/12/2023    Lumbar foraminal stenosis 12/12/2023    Lumbar neuritis 12/12/2023    Lumbar spondylosis " "12/12/2023    Neuropathy 12/12/2023    Nondisplaced fracture of proximal end of right tibia 12/12/2023    Pancytopenia (Multi) 12/12/2023    Right knee pain 12/12/2023    Right median nerve neuropathy 12/12/2023    Spinal cord compression (Multi) 12/12/2023    Sprain of medial collateral ligament of right knee 12/12/2023    Subclavian vein obstruction (Multi) 12/12/2023    Subsequent non-ST elevation (NSTEMI) myocardial infarction (Multi) 12/12/2023    Venous hypertension 12/12/2023    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    End stage renal failure on dialysis (Multi) 07/19/2023    Functional diarrhea 07/19/2023    Acquired hypothyroidism 06/14/2023    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) 06/14/2023    Primary hypertension 06/14/2023    Diarrhea of presumed infectious origin 06/14/2023    Irritable bowel syndrome with diarrhea 06/14/2023          Current Outpatient Medications:     aspirin 81 mg EC tablet, Take 1 tablet (81 mg) by mouth once daily., Disp: , Rfl:     atorvastatin (Lipitor) 40 mg tablet, Take 1 tablet (40 mg) by mouth once daily at bedtime., Disp: , Rfl:     B complex-vitamin C-folic acid (Tre Caps) 1 mg capsule, once every 24 hours., Disp: , Rfl:     BD Insulin Syringe Ultra-Fine 0.5 mL 31 gauge x 5/16\" syringe, USE AS DIRECTED TWICE A DAY, Disp: , Rfl:     calcium carbonate (Tums Ultra) 400 mg calcium (1,000 mg) chewable tablet, Chew., Disp: , Rfl:     cholecalciferol (Vitamin D3) 50 MCG (2000 UT) tablet, BID, Disp: , Rfl:     cyanocobalamin (Vitamin B-12) 1,000 mcg tablet, Take 1 tablet (1,000 mcg) by mouth once daily., Disp: , Rfl:     ergocalciferol (Vitamin D-2) 1.25 MG (23132 UT) capsule, Take 1 capsule (1,250 mcg) by mouth 1 (one) time per week., Disp: , Rfl:     gabapentin (Neurontin) 100 mg capsule, TAKE 2 CAPSULE IN THE MORNING (AFTER DIALYSIS) AND TAKE 2 CAPSULES AT BEDTIME DAILY., Disp: , Rfl:     insulin NPH and regular human (HumuLIN 70-30, " NovoLIN 70-30) 100 unit/mL (70-30) injection, Inject under the skin 2 times a day before meals. Take as directed per insulin instructions., Disp: , Rfl:     levothyroxine (Synthroid, Levoxyl) 100 mcg tablet, , Disp: , Rfl:     lisinopril 40 mg tablet, Take 1 tablet (40 mg) by mouth once daily., Disp: , Rfl:     loperamide (Imodium) 2 mg capsule, Take by mouth., Disp: , Rfl:     metoprolol tartrate (Lopressor) 25 mg tablet, Take 0.5 tablets (12.5 mg) by mouth once daily., Disp: , Rfl:     NovoLIN N NPH U-100 Insulin 100 unit/mL injection, 15 UNITS SUBCUTANEOUSLY ONCE DAILY 90 DAYS, Disp: , Rfl:     traMADol (Ultram) 50 mg tablet, Take 1 tablet (50 mg) by mouth every 6 hours if needed., Disp: , Rfl:      Lab Results   Component Value Date    WBC 6.2 07/02/2024    HGB 12.0 07/02/2024    HCT 35.0 (L) 07/02/2024     (L) 07/02/2024    CHOL 110 03/06/2023    TRIG 129 03/06/2023    HDL 30.7 (A) 03/06/2023    ALT 11 04/12/2022    AST 13 04/12/2022     (L) 07/02/2024    K 5.0 07/02/2024    CL 93 (L) 07/02/2024    CREATININE 5.59 (H) 07/02/2024    BUN 53 (H) 07/02/2024    CO2 25 07/02/2024    TSH 5.26 (H) 11/07/2023    INR 2.0 (H) 03/07/2023    HGBA1C 6.0 (H) 11/07/2023    HGBA1C 6.0 (H) 11/07/2023       No results found.              Assessment/Plan   Active Problems:  There are no active Hospital Problems.      This patient presents today for follow-up of her revision of her left arm AV fistula.  She had an ulcer developed over one of her aneurysms in her mid upper arm medial aspect.  The incision appears to be relatively stable without signs of obvious necrosis.  She denies any significant pain.  She denies any bleeding from this area.  I would like her to follow-up in 1 week for reevaluation.      Adis Alford, DO

## 2024-07-16 ENCOUNTER — APPOINTMENT (OUTPATIENT)
Dept: VASCULAR SURGERY | Facility: CLINIC | Age: 79
End: 2024-07-16
Payer: COMMERCIAL

## 2024-07-16 VITALS
BODY MASS INDEX: 28.17 KG/M2 | SYSTOLIC BLOOD PRESSURE: 108 MMHG | DIASTOLIC BLOOD PRESSURE: 60 MMHG | HEIGHT: 63 IN | HEART RATE: 61 BPM | OXYGEN SATURATION: 99 %

## 2024-07-16 DIAGNOSIS — T82.858D ARTERIOVENOUS FISTULA STENOSIS, SUBSEQUENT ENCOUNTER: Primary | ICD-10-CM

## 2024-07-16 PROCEDURE — 99024 POSTOP FOLLOW-UP VISIT: CPT | Performed by: SURGERY

## 2024-07-16 PROCEDURE — 3078F DIAST BP <80 MM HG: CPT | Performed by: SURGERY

## 2024-07-16 PROCEDURE — 3074F SYST BP LT 130 MM HG: CPT | Performed by: SURGERY

## 2024-07-16 PROCEDURE — 1159F MED LIST DOCD IN RCRD: CPT | Performed by: SURGERY

## 2024-07-16 PROCEDURE — 1160F RVW MEDS BY RX/DR IN RCRD: CPT | Performed by: SURGERY

## 2024-07-18 NOTE — PROGRESS NOTES
"Lyly Draper is a 79 y.o. female     Subjective   This patient presents today for follow-up of left upper extremity AV fistula.  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  She states that she is doing relatively well.  Her dialysis is going well.  They are able to continue her dialysis via her left arm AV fistula.  She denies any fever chills nausea vomiting or headache         Objective     Vitals:    07/16/24 1100   BP: 108/60   Pulse: 61   SpO2: 99%      Physical Exam  This patient is alert and oriented x 3.  She is in no acute distress.  Her left upper arm AV fistula has a palpable thrill in her antecubital area and then the fistula is mostly pulsatile.  The area of the revision of her fistula appears intact.  There is no obvious signs of skin necrosis.  Blood pressure 108/60, pulse 61, height 1.6 m (5' 3\"), SpO2 99%.            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) 12/12/2023    Cervical radiculitis 12/12/2023    Chronic neck pain 12/12/2023    Diabetes mellitus (Multi) 12/12/2023    (HFpEF) heart failure with preserved ejection fraction (Multi) 12/12/2023    Diastolic congestive heart failure (Multi) 12/12/2023    Edema 12/12/2023    Elevated troponin 12/12/2023    Hemodialysis access, fistula mature (CMS-HCC) 12/12/2023    Hemodialysis-associated hypotension 12/12/2023    History of thrombocytopenia 12/12/2023    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    Hypercholesterolemia 12/12/2023    Hyperphosphatemia 12/12/2023    Hypertension, benign 12/12/2023    Low vitamin D level 12/12/2023    Lumbar foraminal stenosis 12/12/2023    Lumbar neuritis " "12/12/2023    Lumbar spondylosis 12/12/2023    Neuropathy 12/12/2023    Nondisplaced fracture of proximal end of right tibia 12/12/2023    Pancytopenia (Multi) 12/12/2023    Right knee pain 12/12/2023    Right median nerve neuropathy 12/12/2023    Spinal cord compression (Multi) 12/12/2023    Sprain of medial collateral ligament of right knee 12/12/2023    Subclavian vein obstruction (Multi) 12/12/2023    Subsequent non-ST elevation (NSTEMI) myocardial infarction (Multi) 12/12/2023    Venous hypertension 12/12/2023    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    End stage renal failure on dialysis (Multi) 07/19/2023    Functional diarrhea 07/19/2023    Acquired hypothyroidism 06/14/2023    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) 06/14/2023    Primary hypertension 06/14/2023    Diarrhea of presumed infectious origin 06/14/2023    Irritable bowel syndrome with diarrhea 06/14/2023          Current Outpatient Medications:     aspirin 81 mg EC tablet, Take 1 tablet (81 mg) by mouth once daily., Disp: , Rfl:     atorvastatin (Lipitor) 40 mg tablet, Take 1 tablet (40 mg) by mouth once daily at bedtime., Disp: , Rfl:     B complex-vitamin C-folic acid (Tre Caps) 1 mg capsule, once every 24 hours., Disp: , Rfl:     BD Insulin Syringe Ultra-Fine 0.5 mL 31 gauge x 5/16\" syringe, USE AS DIRECTED TWICE A DAY, Disp: , Rfl:     calcium carbonate (Tums Ultra) 400 mg calcium (1,000 mg) chewable tablet, Chew., Disp: , Rfl:     cholecalciferol (Vitamin D3) 50 MCG (2000 UT) tablet, BID, Disp: , Rfl:     cyanocobalamin (Vitamin B-12) 1,000 mcg tablet, Take 1 tablet (1,000 mcg) by mouth once daily., Disp: , Rfl:     ergocalciferol (Vitamin D-2) 1.25 MG (61640 UT) capsule, Take 1 capsule (1,250 mcg) by mouth 1 (one) time per week., Disp: , Rfl:     gabapentin (Neurontin) 100 mg capsule, TAKE 2 CAPSULE IN THE MORNING (AFTER DIALYSIS) AND TAKE 2 CAPSULES AT BEDTIME DAILY., Disp: , Rfl:     insulin NPH and " regular human (HumuLIN 70-30, NovoLIN 70-30) 100 unit/mL (70-30) injection, Inject under the skin 2 times a day before meals. Take as directed per insulin instructions., Disp: , Rfl:     levothyroxine (Synthroid, Levoxyl) 100 mcg tablet, , Disp: , Rfl:     lisinopril 40 mg tablet, Take 1 tablet (40 mg) by mouth once daily., Disp: , Rfl:     loperamide (Imodium) 2 mg capsule, Take by mouth., Disp: , Rfl:     metoprolol tartrate (Lopressor) 25 mg tablet, Take 0.5 tablets (12.5 mg) by mouth once daily., Disp: , Rfl:     NovoLIN N NPH U-100 Insulin 100 unit/mL injection, 15 UNITS SUBCUTANEOUSLY ONCE DAILY 90 DAYS, Disp: , Rfl:     traMADol (Ultram) 50 mg tablet, Take 1 tablet (50 mg) by mouth every 6 hours if needed., Disp: , Rfl:      Lab Results   Component Value Date    WBC 6.2 07/02/2024    HGB 12.0 07/02/2024    HCT 35.0 (L) 07/02/2024     (L) 07/02/2024    CHOL 110 03/06/2023    TRIG 129 03/06/2023    HDL 30.7 (A) 03/06/2023    ALT 11 04/12/2022    AST 13 04/12/2022     (L) 07/02/2024    K 5.0 07/02/2024    CL 93 (L) 07/02/2024    CREATININE 5.59 (H) 07/02/2024    BUN 53 (H) 07/02/2024    CO2 25 07/02/2024    TSH 5.26 (H) 11/07/2023    INR 2.0 (H) 03/07/2023    HGBA1C 6.0 (H) 11/07/2023    HGBA1C 6.0 (H) 11/07/2023       No results found.              Assessment/Plan   Active Problems:  There are no active Hospital Problems.      This patient presents today for follow-up of her revision of her left arm AV fistula for an unstable ulcer.  The surgical incision appears to be fairly well-approximated.  There is no signs of skin necrosis.  They are still able to use her fistula for dialysis.  Overall, I am extremely pleased with the progress over healing.  I would like her to follow-up in 1 week for another evaluation of her surgical site.  I am pleased with her progress      Adis Alford, DO

## 2024-07-23 ENCOUNTER — LAB (OUTPATIENT)
Dept: LAB | Facility: LAB | Age: 79
End: 2024-07-23
Payer: COMMERCIAL

## 2024-07-23 ENCOUNTER — OFFICE VISIT (OUTPATIENT)
Dept: VASCULAR SURGERY | Facility: CLINIC | Age: 79
End: 2024-07-23
Payer: COMMERCIAL

## 2024-07-23 ENCOUNTER — APPOINTMENT (OUTPATIENT)
Dept: VASCULAR SURGERY | Facility: CLINIC | Age: 79
End: 2024-07-23
Payer: COMMERCIAL

## 2024-07-23 VITALS — DIASTOLIC BLOOD PRESSURE: 64 MMHG | SYSTOLIC BLOOD PRESSURE: 138 MMHG | HEART RATE: 51 BPM

## 2024-07-23 DIAGNOSIS — E11.9 TYPE 2 DIABETES MELLITUS WITHOUT COMPLICATIONS (MULTI): Primary | ICD-10-CM

## 2024-07-23 DIAGNOSIS — I82.B12: Primary | ICD-10-CM

## 2024-07-23 DIAGNOSIS — E03.9 HYPOTHYROIDISM, UNSPECIFIED: ICD-10-CM

## 2024-07-23 DIAGNOSIS — T82.858D ARTERIOVENOUS FISTULA STENOSIS, SUBSEQUENT ENCOUNTER: ICD-10-CM

## 2024-07-23 LAB
CHOLEST SERPL-MCNC: 119 MG/DL (ref 0–199)
CHOLESTEROL/HDL RATIO: 3.2
EST. AVERAGE GLUCOSE BLD GHB EST-MCNC: 137 MG/DL
HBA1C MFR BLD: 6.4 %
HDLC SERPL-MCNC: 37.5 MG/DL
LDLC SERPL CALC-MCNC: 47 MG/DL
NON HDL CHOLESTEROL: 82 MG/DL (ref 0–149)
TRIGL SERPL-MCNC: 173 MG/DL (ref 0–149)
TSH SERPL-ACNC: 3.9 MIU/L (ref 0.44–3.98)
VLDL: 35 MG/DL (ref 0–40)

## 2024-07-23 PROCEDURE — 1159F MED LIST DOCD IN RCRD: CPT | Performed by: SURGERY

## 2024-07-23 PROCEDURE — 3075F SYST BP GE 130 - 139MM HG: CPT | Performed by: SURGERY

## 2024-07-23 PROCEDURE — 3078F DIAST BP <80 MM HG: CPT | Performed by: SURGERY

## 2024-07-23 PROCEDURE — 84443 ASSAY THYROID STIM HORMONE: CPT

## 2024-07-23 PROCEDURE — 99024 POSTOP FOLLOW-UP VISIT: CPT | Performed by: SURGERY

## 2024-07-23 PROCEDURE — 80061 LIPID PANEL: CPT

## 2024-07-23 PROCEDURE — 83036 HEMOGLOBIN GLYCOSYLATED A1C: CPT

## 2024-07-23 PROCEDURE — 1160F RVW MEDS BY RX/DR IN RCRD: CPT | Performed by: SURGERY

## 2024-07-28 NOTE — PROGRESS NOTES
Lyly Draper is a 79 y.o. female     Subjective   This patient presents today for follow-up of her recent revision of her left arm AV fistula.  She denies any fever chills nausea vomiting or headache.  She states that dialysis is going well         Objective     Vitals:    07/23/24 1152   BP: 138/64   Pulse: 51      Physical Exam  This patient is alert and oriented x 3.  She is in no acute distress.  Head is normocephalic.  Pupils are equal and reactive to light and accommodation.  Neck is soft and supple without palpable lymph nodes.  Her left arm AV fistula has both thrill and pulse.  Her surgical incision line appears to be intact with some possible superficial skin loss in the small area.  There is no redness or tenderness.  The rest of the surgical line looks intact and stable  Blood pressure 138/64, pulse 51.            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) 12/12/2023    Cervical radiculitis 12/12/2023    Chronic neck pain 12/12/2023    Diabetes mellitus (Multi) 12/12/2023    (HFpEF) heart failure with preserved ejection fraction (Multi) 12/12/2023    Diastolic congestive heart failure (Multi) 12/12/2023    Edema 12/12/2023    Elevated troponin 12/12/2023    Hemodialysis access, fistula mature (CMS-[facility]) 12/12/2023    Hemodialysis-associated hypotension 12/12/2023    History of thrombocytopenia 12/12/2023    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    Hypercholesterolemia 12/12/2023    Hyperphosphatemia 12/12/2023    Hypertension, benign 12/12/2023    Low vitamin D level 12/12/2023    Lumbar foraminal stenosis 12/12/2023    Lumbar neuritis 12/12/2023    Lumbar spondylosis 12/12/2023    Neuropathy 12/12/2023    Nondisplaced  "fracture of proximal end of right tibia 12/12/2023    Pancytopenia (Multi) 12/12/2023    Right knee pain 12/12/2023    Right median nerve neuropathy 12/12/2023    Spinal cord compression (Multi) 12/12/2023    Sprain of medial collateral ligament of right knee 12/12/2023    Subclavian vein obstruction (Multi) 12/12/2023    Subsequent non-ST elevation (NSTEMI) myocardial infarction (Multi) 12/12/2023    Venous hypertension 12/12/2023    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    End stage renal failure on dialysis (Multi) 07/19/2023    Functional diarrhea 07/19/2023    Acquired hypothyroidism 06/14/2023    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) 06/14/2023    Primary hypertension 06/14/2023    Diarrhea of presumed infectious origin 06/14/2023    Irritable bowel syndrome with diarrhea 06/14/2023          Current Outpatient Medications:     aspirin 81 mg EC tablet, Take 1 tablet (81 mg) by mouth once daily., Disp: , Rfl:     atorvastatin (Lipitor) 40 mg tablet, Take 1 tablet (40 mg) by mouth once daily at bedtime., Disp: , Rfl:     B complex-vitamin C-folic acid ([place] Caps) 1 mg capsule, once every 24 hours., Disp: , Rfl:     BD Insulin Syringe Ultra-Fine 0.5 mL 31 gauge x 5/16\" syringe, USE AS DIRECTED TWICE A DAY, Disp: , Rfl:     calcium carbonate (Tums Ultra) 400 mg calcium (1,000 mg) chewable tablet, Chew., Disp: , Rfl:     cholecalciferol (Vitamin D3) 50 MCG (2000 UT) tablet, BID, Disp: , Rfl:     cyanocobalamin (Vitamin B-12) 1,000 mcg tablet, Take 1 tablet (1,000 mcg) by mouth once daily., Disp: , Rfl:     ergocalciferol (Vitamin D-2) 1.25 MG (56727 UT) capsule, Take 1 capsule (1,250 mcg) by mouth 1 (one) time per week., Disp: , Rfl:     gabapentin (Neurontin) 100 mg capsule, TAKE 2 CAPSULE IN THE MORNING (AFTER DIALYSIS) AND TAKE 2 CAPSULES AT BEDTIME DAILY., Disp: , Rfl:     insulin NPH and regular human (HumuLIN 70-30, NovoLIN 70-30) 100 unit/mL (70-30) injection, Inject " under the skin 2 times a day before meals. Take as directed per insulin instructions., Disp: , Rfl:     levothyroxine (Synthroid, Levoxyl) 100 mcg tablet, , Disp: , Rfl:     lisinopril 40 mg tablet, Take 1 tablet (40 mg) by mouth once daily., Disp: , Rfl:     loperamide (Imodium) 2 mg capsule, Take by mouth., Disp: , Rfl:     metoprolol tartrate (Lopressor) 25 mg tablet, Take 0.5 tablets (12.5 mg) by mouth once daily., Disp: , Rfl:     NovoLIN N NPH U-100 Insulin 100 unit/mL injection, 15 UNITS SUBCUTANEOUSLY ONCE DAILY 90 DAYS, Disp: , Rfl:     traMADol (Ultram) 50 mg tablet, Take 1 tablet (50 mg) by mouth every 6 hours if needed., Disp: , Rfl:      Lab Results   Component Value Date    WBC 6.2 07/02/2024    HGB 12.0 07/02/2024    HCT 35.0 (L) 07/02/2024     (L) 07/02/2024    CHOL 119 07/23/2024    TRIG 173 (H) 07/23/2024    HDL 37.5 07/23/2024    ALT 11 04/12/2022    AST 13 04/12/2022     (L) 07/02/2024    K 5.0 07/02/2024    CL 93 (L) 07/02/2024    CREATININE 5.59 (H) 07/02/2024    BUN 53 (H) 07/02/2024    CO2 25 07/02/2024    TSH 3.90 07/23/2024    INR 2.0 (H) 03/07/2023    HGBA1C 6.4 (H) 07/23/2024       No results found.              Assessment/Plan   Active Problems:  There are no active Hospital Problems.      This patient presents today for follow-up of her revision of her left arm AV fistula for an unstable ulcer.  Overall, the incision line looks good.  There is 1 area that has a very superficial disruption of her skin.  At this time, I would like for her to place an alcohol pad over this area with a sterile bandage.  She will follow-up with me in 1 week.  Overall, I do feel better about her revision incision.      Adis Alford, DO

## 2024-07-30 ENCOUNTER — APPOINTMENT (OUTPATIENT)
Dept: VASCULAR SURGERY | Facility: CLINIC | Age: 79
End: 2024-07-30
Payer: COMMERCIAL

## 2024-07-30 VITALS
HEART RATE: 47 BPM | BODY MASS INDEX: 28.17 KG/M2 | DIASTOLIC BLOOD PRESSURE: 74 MMHG | OXYGEN SATURATION: 98 % | WEIGHT: 159 LBS | HEIGHT: 63 IN | SYSTOLIC BLOOD PRESSURE: 126 MMHG

## 2024-07-30 DIAGNOSIS — T82.858D STENOSIS OF ARTERIOVENOUS DIALYSIS FISTULA, SUBSEQUENT ENCOUNTER: Primary | ICD-10-CM

## 2024-07-30 PROCEDURE — 3074F SYST BP LT 130 MM HG: CPT | Performed by: SURGERY

## 2024-07-30 PROCEDURE — 1159F MED LIST DOCD IN RCRD: CPT | Performed by: SURGERY

## 2024-07-30 PROCEDURE — 3078F DIAST BP <80 MM HG: CPT | Performed by: SURGERY

## 2024-07-30 PROCEDURE — 1160F RVW MEDS BY RX/DR IN RCRD: CPT | Performed by: SURGERY

## 2024-07-30 PROCEDURE — 99024 POSTOP FOLLOW-UP VISIT: CPT | Performed by: SURGERY

## 2024-07-31 PROBLEM — T82.858A: Status: RESOLVED | Noted: 2023-12-01 | Resolved: 2024-07-31

## 2024-08-01 NOTE — PROGRESS NOTES
"Lyly Draper is a 79 y.o. female     Subjective   This patient was seen in the office for follow-up of her revision of her left upper arm AV fistula.  She denies any fever chills nausea vomiting or headache.  She states that dialysis is going well         Objective     Vitals:    07/30/24 1100   BP: 126/74   Pulse: (!) 47   SpO2: 98%      Physical Exam  Her left arm AV fistula has both thrill and pulse.  Her surgical incision line has what appears to be a very superficial scab that appears to be getting better on a weekly basis.  Blood pressure 126/74, pulse (!) 47, height 1.6 m (5' 3\"), weight 72.1 kg (159 lb), SpO2 98%.            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) 12/12/2023    Cervical radiculitis 12/12/2023    Chronic neck pain 12/12/2023    Diabetes mellitus (Multi) 12/12/2023    (HFpEF) heart failure with preserved ejection fraction (Multi) 12/12/2023    Diastolic congestive heart failure (Multi) 12/12/2023    Edema 12/12/2023    Elevated troponin 12/12/2023    Hemodialysis access, fistula mature (CMS-[facility]) 12/12/2023    Hemodialysis-associated hypotension 12/12/2023    History of thrombocytopenia 12/12/2023    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    Hypercholesterolemia 12/12/2023    Hyperphosphatemia 12/12/2023    Hypertension, benign 12/12/2023    Low vitamin D level 12/12/2023    Lumbar foraminal stenosis 12/12/2023    Lumbar neuritis 12/12/2023    Lumbar spondylosis 12/12/2023    Neuropathy 12/12/2023    Nondisplaced fracture of proximal end of right tibia 12/12/2023    Pancytopenia (Multi) 12/12/2023    Right knee pain 12/12/2023    Right median nerve neuropathy 12/12/2023    Spinal cord compression (Multi) " "12/12/2023    Sprain of medial collateral ligament of right knee 12/12/2023    Subclavian vein obstruction (Multi) 12/12/2023    Subsequent non-ST elevation (NSTEMI) myocardial infarction (Multi) 12/12/2023    Venous hypertension 12/12/2023    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    End stage renal failure on dialysis (Multi) 07/19/2023    Functional diarrhea 07/19/2023    Acquired hypothyroidism 06/14/2023    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) 06/14/2023    Primary hypertension 06/14/2023    Diarrhea of presumed infectious origin 06/14/2023    Irritable bowel syndrome with diarrhea 06/14/2023          Current Outpatient Medications:     aspirin 81 mg EC tablet, Take 1 tablet (81 mg) by mouth once daily., Disp: , Rfl:     atorvastatin (Lipitor) 40 mg tablet, Take 1 tablet (40 mg) by mouth once daily at bedtime., Disp: , Rfl:     B complex-vitamin C-folic acid ([place] Caps) 1 mg capsule, once every 24 hours., Disp: , Rfl:     BD Insulin Syringe Ultra-Fine 0.5 mL 31 gauge x 5/16\" syringe, USE AS DIRECTED TWICE A DAY, Disp: , Rfl:     calcium carbonate (Tums Ultra) 400 mg calcium (1,000 mg) chewable tablet, Chew., Disp: , Rfl:     cholecalciferol (Vitamin D3) 50 MCG (2000 UT) tablet, BID, Disp: , Rfl:     cyanocobalamin (Vitamin B-12) 1,000 mcg tablet, Take 1 tablet (1,000 mcg) by mouth once daily., Disp: , Rfl:     ergocalciferol (Vitamin D-2) 1.25 MG (76131 UT) capsule, Take 1 capsule (1,250 mcg) by mouth 1 (one) time per week., Disp: , Rfl:     gabapentin (Neurontin) 100 mg capsule, TAKE 2 CAPSULE IN THE MORNING (AFTER DIALYSIS) AND TAKE 2 CAPSULES AT BEDTIME DAILY., Disp: , Rfl:     insulin NPH and regular human (HumuLIN 70-30, NovoLIN 70-30) 100 unit/mL (70-30) injection, Inject under the skin 2 times a day before meals. Take as directed per insulin instructions., Disp: , Rfl:     levothyroxine (Synthroid, Levoxyl) 100 mcg tablet, , Disp: , Rfl:     lisinopril 40 mg tablet, " Take 1 tablet (40 mg) by mouth once daily., Disp: , Rfl:     loperamide (Imodium) 2 mg capsule, Take by mouth., Disp: , Rfl:     metoprolol tartrate (Lopressor) 25 mg tablet, Take 0.5 tablets (12.5 mg) by mouth once daily., Disp: , Rfl:     NovoLIN N NPH U-100 Insulin 100 unit/mL injection, 15 UNITS SUBCUTANEOUSLY ONCE DAILY 90 DAYS, Disp: , Rfl:     traMADol (Ultram) 50 mg tablet, Take 1 tablet (50 mg) by mouth every 6 hours if needed., Disp: , Rfl:      Lab Results   Component Value Date    WBC 6.2 07/02/2024    HGB 12.0 07/02/2024    HCT 35.0 (L) 07/02/2024     (L) 07/02/2024    CHOL 119 07/23/2024    TRIG 173 (H) 07/23/2024    HDL 37.5 07/23/2024    ALT 11 04/12/2022    AST 13 04/12/2022     (L) 07/02/2024    K 5.0 07/02/2024    CL 93 (L) 07/02/2024    CREATININE 5.59 (H) 07/02/2024    BUN 53 (H) 07/02/2024    CO2 25 07/02/2024    TSH 3.90 07/23/2024    INR 2.0 (H) 03/07/2023    HGBA1C 6.4 (H) 07/23/2024    HGBA1C 6.4 (H) 07/23/2024       No results found.              Assessment/Plan   Active Problems:  There are no active Hospital Problems.      This patient presents today for follow-up of her revision of her left arm AV fistula.  She had a necrotic ulcer that was very unstable and I did a resection of this area which involved an aneurysmal portion of her fistula.  Overall, the surgical incision line seems to be improving with a very superficial scabbed area.  I still need to follow her on a weekly basis.  Dialysis is going well.      Adis Alford, DO

## 2024-08-27 ENCOUNTER — APPOINTMENT (OUTPATIENT)
Dept: VASCULAR SURGERY | Facility: CLINIC | Age: 79
End: 2024-08-27
Payer: COMMERCIAL

## 2024-08-29 ENCOUNTER — APPOINTMENT (OUTPATIENT)
Dept: VASCULAR SURGERY | Facility: CLINIC | Age: 79
End: 2024-08-29
Payer: COMMERCIAL

## 2024-08-29 VITALS
HEART RATE: 69 BPM | DIASTOLIC BLOOD PRESSURE: 62 MMHG | OXYGEN SATURATION: 94 % | BODY MASS INDEX: 28.35 KG/M2 | SYSTOLIC BLOOD PRESSURE: 140 MMHG | HEIGHT: 63 IN | WEIGHT: 160 LBS

## 2024-08-29 DIAGNOSIS — T82.858D STENOSIS OF ARTERIOVENOUS DIALYSIS FISTULA, SUBSEQUENT ENCOUNTER: Primary | ICD-10-CM

## 2024-08-29 PROCEDURE — 3078F DIAST BP <80 MM HG: CPT | Performed by: SURGERY

## 2024-08-29 PROCEDURE — 99024 POSTOP FOLLOW-UP VISIT: CPT | Performed by: SURGERY

## 2024-08-29 PROCEDURE — 1159F MED LIST DOCD IN RCRD: CPT | Performed by: SURGERY

## 2024-08-29 PROCEDURE — 1160F RVW MEDS BY RX/DR IN RCRD: CPT | Performed by: SURGERY

## 2024-08-29 PROCEDURE — 1036F TOBACCO NON-USER: CPT | Performed by: SURGERY

## 2024-08-29 PROCEDURE — 3077F SYST BP >= 140 MM HG: CPT | Performed by: SURGERY

## 2024-08-30 NOTE — PROGRESS NOTES
"Lyly Draper is a 79 y.o. female     Subjective   This patient presents today for follow-up of her left upper arm AV fistula.  She states that dialysis is going well.  She currently denies any fever chills nausea vomiting or headache         Objective     Vitals:    08/29/24 0900   BP: 140/62   Pulse: 69   SpO2: 94%      Physical Exam  This patient is alert and oriented x 3.  She is in no acute distress.  Head is normocephalic.  Pupils are equal and reactive to light and accommodation.  Neck is soft and supple without palpable lymph nodes.  Heart is regular rate.  Lungs are clear.  Abdomen is soft with positive bowel sounds no pain on palpation.  Right upper extremity seems to have adequate range of motion her left upper extremity has some decreased range of motion..  Left upper extremity volume is greater than the right.  She has some decreased range of motion in her lower extremities.  She does have both thrill and pulse in her left upper arm AV fistula.  She has a small scab remaining where I did a revision.  Blood pressure 140/62, pulse 69, height 1.6 m (5' 3\"), weight 72.6 kg (160 lb), SpO2 94%.            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) 12/12/2023    Cervical radiculitis 12/12/2023    Chronic neck pain 12/12/2023    Diabetes mellitus (Multi) 12/12/2023    (HFpEF) heart failure with preserved ejection fraction (Multi) 12/12/2023    Diastolic congestive heart failure (Multi) 12/12/2023    Edema 12/12/2023    Elevated troponin 12/12/2023    Hemodialysis access, fistula mature (CMS-[facility]) 12/12/2023    Hemodialysis-associated hypotension 12/12/2023    History of thrombocytopenia 12/12/2023    Endogenous hyperlipidemia " "12/12/2023    Hypercholesterolemia 12/12/2023    Hyperphosphatemia 12/12/2023    Hypertension, benign 12/12/2023    Low vitamin D level 12/12/2023    Lumbar foraminal stenosis 12/12/2023    Lumbar neuritis 12/12/2023    Lumbar spondylosis 12/12/2023    Neuropathy 12/12/2023    Nondisplaced fracture of proximal end of right tibia 12/12/2023    Pancytopenia (Multi) 12/12/2023    Right knee pain 12/12/2023    Right median nerve neuropathy 12/12/2023    Spinal cord compression (Multi) 12/12/2023    Sprain of medial collateral ligament of right knee 12/12/2023    Subclavian vein obstruction (Multi) 12/12/2023    Subsequent non-ST elevation (NSTEMI) myocardial infarction (Multi) 12/12/2023    Venous hypertension 12/12/2023    Stenosis of arteriovenous dialysis fistula (CMS-HCC) 12/01/2023    End stage renal failure on dialysis (Multi) 07/19/2023    Functional diarrhea 07/19/2023    Acquired hypothyroidism 06/14/2023    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) 06/14/2023    Primary hypertension 06/14/2023    Diarrhea of presumed infectious origin 06/14/2023    Irritable bowel syndrome with diarrhea 06/14/2023          Current Outpatient Medications:     aspirin 81 mg EC tablet, Take 1 tablet (81 mg) by mouth once daily., Disp: , Rfl:     atorvastatin (Lipitor) 40 mg tablet, Take 1 tablet (40 mg) by mouth once daily at bedtime., Disp: , Rfl:     B complex-vitamin C-folic acid ([place] Caps) 1 mg capsule, once every 24 hours., Disp: , Rfl:     BD Insulin Syringe Ultra-Fine 0.5 mL 31 gauge x 5/16\" syringe, USE AS DIRECTED TWICE A DAY, Disp: , Rfl:     calcium carbonate (Tums Ultra) 400 mg calcium (1,000 mg) chewable tablet, Chew., Disp: , Rfl:     cholecalciferol (Vitamin D3) 50 MCG (2000 UT) tablet, BID, Disp: , Rfl:     cyanocobalamin (Vitamin B-12) 1,000 mcg tablet, Take 1 tablet (1,000 mcg) by mouth once daily., Disp: , Rfl:     ergocalciferol (Vitamin D-2) 1.25 MG (99465 UT) capsule, Take 1 " capsule (1,250 mcg) by mouth 1 (one) time per week., Disp: , Rfl:     gabapentin (Neurontin) 100 mg capsule, TAKE 2 CAPSULE IN THE MORNING (AFTER DIALYSIS) AND TAKE 2 CAPSULES AT BEDTIME DAILY., Disp: , Rfl:     insulin NPH and regular human (HumuLIN 70-30, NovoLIN 70-30) 100 unit/mL (70-30) injection, Inject under the skin 2 times a day before meals. Take as directed per insulin instructions., Disp: , Rfl:     levothyroxine (Synthroid, Levoxyl) 100 mcg tablet, , Disp: , Rfl:     lisinopril 40 mg tablet, Take 1 tablet (40 mg) by mouth once daily., Disp: , Rfl:     loperamide (Imodium) 2 mg capsule, Take by mouth., Disp: , Rfl:     metoprolol tartrate (Lopressor) 25 mg tablet, Take 0.5 tablets (12.5 mg) by mouth once daily., Disp: , Rfl:     NovoLIN N NPH U-100 Insulin 100 unit/mL injection, 15 UNITS SUBCUTANEOUSLY ONCE DAILY 90 DAYS, Disp: , Rfl:     traMADol (Ultram) 50 mg tablet, Take 1 tablet (50 mg) by mouth every 6 hours if needed., Disp: , Rfl:      Lab Results   Component Value Date    WBC 6.2 07/02/2024    HGB 12.0 07/02/2024    HCT 35.0 (L) 07/02/2024     (L) 07/02/2024    CHOL 119 07/23/2024    TRIG 173 (H) 07/23/2024    HDL 37.5 07/23/2024    ALT 11 04/12/2022    AST 13 04/12/2022     (L) 07/02/2024    K 5.0 07/02/2024    CL 93 (L) 07/02/2024    CREATININE 5.59 (H) 07/02/2024    BUN 53 (H) 07/02/2024    CO2 25 07/02/2024    TSH 3.90 07/23/2024    INR 2.0 (H) 03/07/2023    HGBA1C 6.4 (H) 07/23/2024    HGBA1C 6.4 (H) 07/23/2024       No results found.              Assessment/Plan   Active Problems:  There are no active Hospital Problems.      This patient presents today for follow-up of her revision of her left upper arm AV fistula.  This was done on 7/2/2024.  She now has a small scab that has improved since her last visit.  There is no significant venous discoloration around the site.  Overall, I feel that she continues to progressively get better with her healing process.  I feel there is a  lower risk for any significant issue.  I would like her to follow-up in 1 month      Adis Alford, DO

## 2024-10-03 ENCOUNTER — APPOINTMENT (OUTPATIENT)
Dept: VASCULAR SURGERY | Facility: CLINIC | Age: 79
End: 2024-10-03
Payer: COMMERCIAL

## 2024-10-03 VITALS
OXYGEN SATURATION: 96 % | HEIGHT: 63 IN | HEART RATE: 69 BPM | DIASTOLIC BLOOD PRESSURE: 64 MMHG | SYSTOLIC BLOOD PRESSURE: 130 MMHG | BODY MASS INDEX: 28.35 KG/M2 | WEIGHT: 160 LBS

## 2024-10-03 DIAGNOSIS — T82.858D STENOSIS OF ARTERIOVENOUS DIALYSIS FISTULA, SUBSEQUENT ENCOUNTER: Primary | ICD-10-CM

## 2024-10-03 DIAGNOSIS — R60.0 LOCALIZED EDEMA: ICD-10-CM

## 2024-10-03 DIAGNOSIS — I82.B12 OCCLUSION OF LEFT SUBCLAVIAN VEIN: ICD-10-CM

## 2024-10-03 PROCEDURE — 99214 OFFICE O/P EST MOD 30 MIN: CPT | Performed by: SURGERY

## 2024-10-03 PROCEDURE — 3078F DIAST BP <80 MM HG: CPT | Performed by: SURGERY

## 2024-10-03 PROCEDURE — 1160F RVW MEDS BY RX/DR IN RCRD: CPT | Performed by: SURGERY

## 2024-10-03 PROCEDURE — 1159F MED LIST DOCD IN RCRD: CPT | Performed by: SURGERY

## 2024-10-03 PROCEDURE — 3075F SYST BP GE 130 - 139MM HG: CPT | Performed by: SURGERY

## 2024-10-04 NOTE — PROGRESS NOTES
"Lyly Draper is a 79 y.o. female     Subjective   This patient presents today for follow-up of her left upper arm AV fistula.  In early July, I did a revision of her left arm AV fistula because of an unstable scab that she had on top of one of her aneurysms.  This has finally healed.  The patient.  She states that dialysis is going relatively well.  Her left arm swells periodically.  The left arm has always been bigger than her right for few years.  She currently denies any fever chills nausea vomiting or headache.  She has never smoked         Objective     Vitals:    10/03/24 0900   BP: 130/64   Pulse: 69   SpO2: 96%      Physical Exam  This patient is alert and oriented x 3.  She is in no acute distress.  Head is normocephalic.  Pupils are equal and reactive to light and accommodation.  Neck is soft and supple without palpable lymph nodes.  Heart is regular rate.  Lungs are clear.  Abdomen is soft with positive bowel sounds there is no pain on palpation.  Right upper extremity seems to have adequate range of motion.  Her lower extremities have some decreased range of motion.  Her left upper extremity has some decreased range of motion.  Her left upper extremity volume is much greater than her right.  Her left arm AV fistula is mostly pulse but there is some thrill.  The area of revision has completely healed.  She does have palpable femoral and popliteal pulses.  Skin turgor is adequate.  Psychologically she seems to be acting appropriately  Blood pressure 130/64, pulse 69, height 1.6 m (5' 3\"), weight 72.6 kg (160 lb), SpO2 96%.            Patient Active Problem List    Diagnosis Date Noted    ESRD (end stage renal disease) (Multi) 07/01/2024    Cellulitis of left upper extremity 06/25/2024    Aortic stenosis 12/12/2023    Arm pain 12/12/2023    Arthritis 12/12/2023    Cervical myelopathy 12/12/2023    Stenosis of cervical spine with myelopathy 12/12/2023    Cervical radiculitis 12/12/2023    Chronic neck pain " "12/12/2023    Diabetes mellitus (Multi) 12/12/2023    (HFpEF) heart failure with preserved ejection fraction 12/12/2023    Diastolic congestive heart failure 12/12/2023    Edema 12/12/2023    Elevated troponin 12/12/2023    Hemodialysis access, fistula mature (CMS-HCC) 12/12/2023    Hemodialysis-associated hypotension 12/12/2023    History of thrombocytopenia 12/12/2023    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    Hypercholesterolemia 12/12/2023    Hyperphosphatemia 12/12/2023    Hypertension, benign 12/12/2023    Low vitamin D level 12/12/2023    Lumbar foraminal stenosis 12/12/2023    Lumbar neuritis 12/12/2023    Lumbar spondylosis 12/12/2023    Neuropathy 12/12/2023    Nondisplaced fracture of proximal end of right tibia 12/12/2023    Pancytopenia 12/12/2023    Right knee pain 12/12/2023    Right median nerve neuropathy 12/12/2023    Spinal cord compression (Multi) 12/12/2023    Sprain of medial collateral ligament of right knee 12/12/2023    Subclavian vein obstruction 12/12/2023    Subsequent non-ST elevation (NSTEMI) myocardial infarction 12/12/2023    Venous hypertension 12/12/2023    Stenosis of arteriovenous dialysis fistula (CMS-[facility]) 12/01/2023    End stage renal failure on dialysis (Multi) 07/19/2023    Functional diarrhea 07/19/2023    Acquired hypothyroidism 06/14/2023    Type 2 diabetes mellitus with diabetic chronic kidney disease 06/14/2023    Primary hypertension 06/14/2023    Diarrhea of presumed infectious origin 06/14/2023    Irritable bowel syndrome with diarrhea 06/14/2023          Current Outpatient Medications:     aspirin 81 mg EC tablet, Take 1 tablet (81 mg) by mouth once daily., Disp: , Rfl:     atorvastatin (Lipitor) 40 mg tablet, Take 1 tablet (40 mg) by mouth once daily at bedtime., Disp: , Rfl:     B complex-vitamin C-folic acid ([place] Caps) 1 mg capsule, once every 24 hours., Disp: , Rfl:     BD Insulin Syringe Ultra-Fine 0.5 mL 31 gauge x 5/16\" syringe, USE AS DIRECTED TWICE A DAY, " Disp: , Rfl:     calcium carbonate (Tums Ultra) 400 mg calcium (1,000 mg) chewable tablet, Chew., Disp: , Rfl:     cholecalciferol (Vitamin D3) 50 MCG (2000 UT) tablet, BID, Disp: , Rfl:     cyanocobalamin (Vitamin B-12) 1,000 mcg tablet, Take 1 tablet (1,000 mcg) by mouth once daily., Disp: , Rfl:     ergocalciferol (Vitamin D-2) 1.25 MG (88903 UT) capsule, Take 1 capsule (1,250 mcg) by mouth 1 (one) time per week., Disp: , Rfl:     gabapentin (Neurontin) 100 mg capsule, TAKE 2 CAPSULE IN THE MORNING (AFTER DIALYSIS) AND TAKE 2 CAPSULES AT BEDTIME DAILY., Disp: , Rfl:     insulin NPH and regular human (HumuLIN 70-30, NovoLIN 70-30) 100 unit/mL (70-30) injection, Inject under the skin 2 times a day before meals. Take as directed per insulin instructions., Disp: , Rfl:     levothyroxine (Synthroid, Levoxyl) 100 mcg tablet, , Disp: , Rfl:     lisinopril 40 mg tablet, Take 1 tablet (40 mg) by mouth once daily., Disp: , Rfl:     loperamide (Imodium) 2 mg capsule, Take by mouth., Disp: , Rfl:     metoprolol tartrate (Lopressor) 25 mg tablet, Take 0.5 tablets (12.5 mg) by mouth once daily., Disp: , Rfl:     NovoLIN N NPH U-100 Insulin 100 unit/mL injection, 15 UNITS SUBCUTANEOUSLY ONCE DAILY 90 DAYS, Disp: , Rfl:     traMADol (Ultram) 50 mg tablet, Take 1 tablet (50 mg) by mouth every 6 hours if needed., Disp: , Rfl:      Lab Results   Component Value Date    WBC 6.2 07/02/2024    HGB 12.0 07/02/2024    HCT 35.0 (L) 07/02/2024     (L) 07/02/2024    CHOL 119 07/23/2024    TRIG 173 (H) 07/23/2024    HDL 37.5 07/23/2024    ALT 11 04/12/2022    AST 13 04/12/2022     (L) 07/02/2024    K 5.0 07/02/2024    CL 93 (L) 07/02/2024    CREATININE 5.59 (H) 07/02/2024    BUN 53 (H) 07/02/2024    CO2 25 07/02/2024    TSH 3.90 07/23/2024    INR 2.0 (H) 03/07/2023    HGBA1C 6.4 (H) 07/23/2024    HGBA1C 6.4 (H) 07/23/2024       No results found.              Assessment/Plan   Active Problems:  There are no active Hospital  Problems.      This patient presents today for follow-up of her left upper arm AV fistula.  She states that dialysis is going well.  Back on 7/2/2024, I did do a revision of her fistula because of an unstable ulcer that she developed over one of her aneurysms.  This has finally healed.  She does mostly have pulse but does have some thrill in the fistula.  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  She has multiple collaterals identified in her arm and chest area.  Overall, I am very pleased that she healed her surgical incision.  She will follow-up with me in 2 months      Adis Alford, DO

## 2024-12-05 ENCOUNTER — APPOINTMENT (OUTPATIENT)
Dept: VASCULAR SURGERY | Facility: CLINIC | Age: 79
End: 2024-12-05
Payer: COMMERCIAL

## 2025-01-02 ENCOUNTER — APPOINTMENT (OUTPATIENT)
Dept: VASCULAR SURGERY | Facility: CLINIC | Age: 80
End: 2025-01-02
Payer: COMMERCIAL

## 2025-01-02 VITALS
HEIGHT: 63 IN | BODY MASS INDEX: 28.53 KG/M2 | DIASTOLIC BLOOD PRESSURE: 72 MMHG | OXYGEN SATURATION: 99 % | WEIGHT: 161 LBS | HEART RATE: 68 BPM | SYSTOLIC BLOOD PRESSURE: 134 MMHG

## 2025-01-02 DIAGNOSIS — I82.B12 OCCLUSION OF LEFT SUBCLAVIAN VEIN: ICD-10-CM

## 2025-01-02 DIAGNOSIS — T82.858D STENOSIS OF ARTERIOVENOUS DIALYSIS FISTULA, SUBSEQUENT ENCOUNTER: Primary | ICD-10-CM

## 2025-01-02 PROCEDURE — 3078F DIAST BP <80 MM HG: CPT | Performed by: SURGERY

## 2025-01-02 PROCEDURE — 1160F RVW MEDS BY RX/DR IN RCRD: CPT | Performed by: SURGERY

## 2025-01-02 PROCEDURE — 3075F SYST BP GE 130 - 139MM HG: CPT | Performed by: SURGERY

## 2025-01-02 PROCEDURE — 1036F TOBACCO NON-USER: CPT | Performed by: SURGERY

## 2025-01-02 PROCEDURE — 99214 OFFICE O/P EST MOD 30 MIN: CPT | Performed by: SURGERY

## 2025-01-02 PROCEDURE — 1159F MED LIST DOCD IN RCRD: CPT | Performed by: SURGERY

## 2025-01-02 NOTE — PROGRESS NOTES
"Lyly Draper is a 79 y.o. female     Subjective   This patient presents today for follow-up of her left brachial basilic fistula.  She states that nothing really unusual is going on with her fistula.  She does have some episodes of some increased bleeding which is not unusual.  She denies any fever chills nausea vomiting or headache         Objective     Vitals:    01/02/25 0900   BP: 134/72   Pulse: 68   SpO2: 99%      Physical Exam  This patient is alert and oriented x 3.  She is in no acute distress.  Head is normocephalic.  Pupils are equal and reactive to light accommodation.  Neck is soft and supple without palpable lymph nodes.  Heart is regular rate.  Lungs are relatively clear.  Abdomen is soft with positive bowel sounds there is no pain on palpation.  Upper extremities seem to have adequate range of motion right greater than left.  Her lower extremities have some decreased range of motion.  Her left brachiobasilic fistula is all pulse in the midportion of her humerus all the way up to the axillary region.  She does have some thrill in the antecubital region.  Her left arm volume is enlarged but not unusually enlarged.  Psychologically she seems to be acting appropriately.  Blood pressure 134/72, pulse 68, height 1.6 m (5' 3\"), weight 73 kg (161 lb), SpO2 99%.            Patient Active Problem List    Diagnosis Date Noted    ESRD (end stage renal disease) (Multi) 07/01/2024    Cellulitis of left upper extremity 06/25/2024    Aortic stenosis 12/12/2023    Arm pain 12/12/2023    Arthritis 12/12/2023    Cervical myelopathy 12/12/2023    Stenosis of cervical spine with myelopathy 12/12/2023    Cervical radiculitis 12/12/2023    Chronic neck pain 12/12/2023    Diabetes mellitus (Multi) 12/12/2023    (HFpEF) heart failure with preserved ejection fraction 12/12/2023    Diastolic congestive heart failure 12/12/2023    Edema 12/12/2023    Elevated troponin 12/12/2023    Hemodialysis access, fistula mature " "(CMS-HCC) 12/12/2023    Hemodialysis-associated hypotension 12/12/2023    History of thrombocytopenia 12/12/2023    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    Hypercholesterolemia 12/12/2023    Hyperphosphatemia 12/12/2023    Hypertension, benign 12/12/2023    Low vitamin D level 12/12/2023    Lumbar foraminal stenosis 12/12/2023    Lumbar neuritis 12/12/2023    Lumbar spondylosis 12/12/2023    Neuropathy 12/12/2023    Nondisplaced fracture of proximal end of right tibia 12/12/2023    Pancytopenia 12/12/2023    Right knee pain 12/12/2023    Right median nerve neuropathy 12/12/2023    Spinal cord compression (Multi) 12/12/2023    Sprain of medial collateral ligament of right knee 12/12/2023    Subclavian vein obstruction 12/12/2023    Subsequent non-ST elevation (NSTEMI) myocardial infarction 12/12/2023    Venous hypertension 12/12/2023    Stenosis of arteriovenous dialysis fistula (CMS-HCC) 12/01/2023    End stage renal failure on dialysis (Multi) 07/19/2023    Functional diarrhea 07/19/2023    Acquired hypothyroidism 06/14/2023    Type 2 diabetes mellitus with diabetic chronic kidney disease 06/14/2023    Primary hypertension 06/14/2023    Diarrhea of presumed infectious origin 06/14/2023    Irritable bowel syndrome with diarrhea 06/14/2023          Current Outpatient Medications:     aspirin 81 mg EC tablet, Take 1 tablet (81 mg) by mouth once daily., Disp: , Rfl:     atorvastatin (Lipitor) 40 mg tablet, Take 1 tablet (40 mg) by mouth once daily at bedtime., Disp: , Rfl:     B complex-vitamin C-folic acid ([place] Caps) 1 mg capsule, once every 24 hours., Disp: , Rfl:     BD Insulin Syringe Ultra-Fine 0.5 mL 31 gauge x 5/16\" syringe, USE AS DIRECTED TWICE A DAY, Disp: , Rfl:     calcium carbonate (Tums Ultra) 400 mg calcium (1,000 mg) chewable tablet, Chew., Disp: , Rfl:     cholecalciferol (Vitamin D3) 50 MCG (2000 UT) tablet, BID, Disp: , Rfl:     cyanocobalamin (Vitamin B-12) 1,000 mcg tablet, Take 1 tablet (1,000 " mcg) by mouth once daily., Disp: , Rfl:     ergocalciferol (Vitamin D-2) 1.25 MG (42476 UT) capsule, Take 1 capsule (1,250 mcg) by mouth 1 (one) time per week., Disp: , Rfl:     gabapentin (Neurontin) 100 mg capsule, TAKE 2 CAPSULE IN THE MORNING (AFTER DIALYSIS) AND TAKE 2 CAPSULES AT BEDTIME DAILY., Disp: , Rfl:     insulin NPH and regular human (HumuLIN 70-30, NovoLIN 70-30) 100 unit/mL (70-30) injection, Inject under the skin 2 times a day before meals. Take as directed per insulin instructions., Disp: , Rfl:     levothyroxine (Synthroid, Levoxyl) 100 mcg tablet, , Disp: , Rfl:     lisinopril 40 mg tablet, Take 1 tablet (40 mg) by mouth once daily., Disp: , Rfl:     loperamide (Imodium) 2 mg capsule, Take by mouth., Disp: , Rfl:     metoprolol tartrate (Lopressor) 25 mg tablet, Take 0.5 tablets (12.5 mg) by mouth once daily., Disp: , Rfl:     NovoLIN N NPH U-100 Insulin 100 unit/mL injection, 15 UNITS SUBCUTANEOUSLY ONCE DAILY 90 DAYS, Disp: , Rfl:     traMADol (Ultram) 50 mg tablet, Take 1 tablet (50 mg) by mouth every 6 hours if needed., Disp: , Rfl:      Lab Results   Component Value Date    WBC 6.2 07/02/2024    HGB 12.0 07/02/2024    HCT 35.0 (L) 07/02/2024     (L) 07/02/2024    CHOL 119 07/23/2024    TRIG 173 (H) 07/23/2024    HDL 37.5 07/23/2024    ALT 11 04/12/2022    AST 13 04/12/2022     (L) 07/02/2024    K 5.0 07/02/2024    CL 93 (L) 07/02/2024    CREATININE 5.59 (H) 07/02/2024    BUN 53 (H) 07/02/2024    CO2 25 07/02/2024    TSH 3.90 07/23/2024    INR 2.0 (H) 03/07/2023    HGBA1C 6.4 (H) 07/23/2024    HGBA1C 6.4 (H) 07/23/2024       No results found.              Assessment/Plan   Assessment & Plan      This patient presents today for follow-up of her left brachial basilic fistula.  She states that dialysis is going relatively well.  She does have occasional issues with prolonged bleeding which is not unusual for her.  She denies any fever chills nausea vomiting or headache.  On physical  exam, she does have a thrill in the antecubital region and then it becomes all pulse.  This is not unusual.  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  I continue to inform her that there is a high probability at some point where you need to set this fistula down and place a permacath for her hemodialysis.  She is in full agreement when that time comes.  I am concerned that she may start having more postdialysis bleeding issues.  She has had multiple multiple multiple interventions done involving her fistula.      Adis Alford, DO

## 2025-03-06 ENCOUNTER — APPOINTMENT (OUTPATIENT)
Dept: VASCULAR SURGERY | Facility: CLINIC | Age: 80
End: 2025-03-06
Payer: COMMERCIAL

## 2025-04-10 ENCOUNTER — APPOINTMENT (OUTPATIENT)
Dept: VASCULAR SURGERY | Facility: CLINIC | Age: 80
End: 2025-04-10
Payer: COMMERCIAL

## (undated) DEVICE — WOUND SYSTEM, DEBRIDEMENT & CLEANING, O.R DUOPAK

## (undated) DEVICE — DRESSING, TELFA, 3X4

## (undated) DEVICE — SYRINGE, 10 CC, SLIP TIP

## (undated) DEVICE — SPONGE, HEMOSTATIC, GELATIN, SURGIFOAM, 8 X 12.5 CM X 10 MM

## (undated) DEVICE — DRAPE, TIBURON, SPLIT SHEET, REINF ADH STRIP, 77X108

## (undated) DEVICE — DRAIN, PENROSE, 0.5 X 12 IN, LATEX, STERILE

## (undated) DEVICE — CUFF, TOURNIQUET, 24 X 4, DUAL PORT/SNGL BLADDER, DISP, LF

## (undated) DEVICE — Device

## (undated) DEVICE — SYRINGE, 10 CC, LUER LOCK

## (undated) DEVICE — SYRINGE, 20 CC, LUER LOCK

## (undated) DEVICE — SPONGE, HEMOSTATIC, CELLULOSE, SURGICEL, 2 X 14 IN

## (undated) DEVICE — APPLICATOR, CHLORAPREP, W/ORANGE TINT, 26ML

## (undated) DEVICE — SYRINGE, HYPODERMIC, LEUR LOCK, 3 CC, PLASTIC, STERILE

## (undated) DEVICE — DRESSING, TRANSPARENT, TEGADERM, 2-3/8 X 2-3/4 IN

## (undated) DEVICE — PITCHER, GRADUATE, 32 OZ (1200CC), STERILE

## (undated) DEVICE — BAG, DECANTER